# Patient Record
Sex: FEMALE | Race: BLACK OR AFRICAN AMERICAN | NOT HISPANIC OR LATINO | Employment: FULL TIME | URBAN - METROPOLITAN AREA
[De-identification: names, ages, dates, MRNs, and addresses within clinical notes are randomized per-mention and may not be internally consistent; named-entity substitution may affect disease eponyms.]

---

## 2017-11-23 ENCOUNTER — HOSPITAL ENCOUNTER (EMERGENCY)
Facility: HOSPITAL | Age: 27
Discharge: HOME | End: 2017-11-24
Attending: EMERGENCY MEDICINE

## 2017-11-23 ENCOUNTER — APPOINTMENT (EMERGENCY)
Dept: RADIOLOGY | Facility: HOSPITAL | Age: 27
End: 2017-11-23

## 2017-11-23 VITALS
DIASTOLIC BLOOD PRESSURE: 81 MMHG | TEMPERATURE: 98.5 F | OXYGEN SATURATION: 100 % | HEART RATE: 90 BPM | RESPIRATION RATE: 20 BRPM | BODY MASS INDEX: 27.89 KG/M2 | SYSTOLIC BLOOD PRESSURE: 161 MMHG | WEIGHT: 200 LBS

## 2017-11-23 DIAGNOSIS — M25.579 ANKLE PAIN: Primary | ICD-10-CM

## 2017-11-23 PROCEDURE — 73610 X-RAY EXAM OF ANKLE: CPT

## 2017-11-23 PROCEDURE — 96372 THER/PROPH/DIAG INJ SC/IM: CPT

## 2017-11-23 RX ORDER — ACETAMINOPHEN 325 MG/1
975 TABLET ORAL ONCE
Status: COMPLETED | OUTPATIENT
Start: 2017-11-23 | End: 2017-11-23

## 2017-11-23 RX ORDER — NAPROXEN 500 MG/1
500 TABLET ORAL 2 TIMES DAILY WITH MEALS
Qty: 14 TABLET | Refills: 0 | Status: SHIPPED | OUTPATIENT
Start: 2017-11-23 | End: 2018-07-09

## 2017-11-23 RX ORDER — KETOROLAC TROMETHAMINE 30 MG/ML
15 INJECTION, SOLUTION INTRAMUSCULAR; INTRAVENOUS ONCE
Status: COMPLETED | OUTPATIENT
Start: 2017-11-23 | End: 2017-11-23

## 2017-11-23 RX ADMIN — KETOROLAC TROMETHAMINE 15 MG: 30 INJECTION, SOLUTION INTRAMUSCULAR at 23:46

## 2017-11-23 RX ADMIN — ACETAMINOPHEN 975 MG: 325 TABLET, FILM COATED ORAL at 23:46

## 2017-11-24 PROCEDURE — 99284 EMERGENCY DEPT VISIT MOD MDM: CPT

## 2017-11-24 NOTE — ED ATTENDING ATTESTATION
Eva Matta MD, saw and evaluated the patient  I have discussed the patient with the resident/non-physician practitioner and agree with the resident's/non-physician practitioner's findings, Plan of Care, and MDM as documented in the resident's/non-physician practitioner's note, except where noted  All available labs and Radiology studies were reviewed  At this point I agree with the current assessment done in the Emergency Department  I have conducted an independent evaluation of this patient a history and physical is as follows:      Critical Care Time  CritCare Time    27 yo female with right ankle pain after stepping off curb and twisting  Pt unable to bear weight on that ankle  No numbness, tingling, weakness  Hx of ankle sprain as child  No pmh  Vss, afebrile, lungs cta, rrr, right ankle lateral malleolar tenderness, mild swelling  Xray negative for fx, aircast, crutches, pain meds

## 2017-11-24 NOTE — DISCHARGE INSTRUCTIONS
Ankle Sprain, Ambulatory Care   GENERAL INFORMATION:   An ankle sprain happens when 1 or more ligaments in your ankle joint stretch or tear  It is usually caused by a direct injury or sudden twisting of the joint  Common symptoms include the following:   · Trouble moving your ankle or foot    · Pain when you touch or put weight on your ankle    · Bruised, swollen, or odd shaped ankle  Seek immediate care for the following symptoms:   · Severe pain in your ankle    · Cold or numb foot or toes    · A weaker ankle    · Swelling that has increased or returned  Treatment for an ankle sprain  may include a supportive device, such as a brace, cast, or splint  These devices limit movement and protect your joint  You may also need to use crutches to decrease your pain as you move around  Treatment may also include pain medicine, physical therapy, or surgery if the ligament does not heal   Care for an ankle sprain:   · Rest  your joint so that it can heal  Return to normal activities as directed  · Ice  helps decrease swelling and pain  Ice may also help prevent tissue damage  Use an ice pack or put crushed ice in a plastic bag  Cover the ice pack with a towel and place it on your injured ligament for 15 to 20 minutes every hour  Use the ice for as long as directed  · Compression  of an elastic bandage provides support and helps decrease swelling and movement so your joint can heal  Ask if you should wrap an elastic bandage around your injured ligament  Wear as long as directed  · Elevate  your injured ankle raised above the level of your heart as often as you can  This will help decrease or limit swelling  Elevate your ankle by resting it on pillows  Prevent another ankle sprain:   · Return to your usual activities as directed  If you start activity too soon, you may develop a more serious injury  · Take it slow  Slowly increase how often and how long you exercise   Sudden increases may cause you to overstretch or tear your ligament  · Always warm up  and stretch before you exercise or play sports  · Use the proper equipment  Always wear shoes that fit well and are made for the activity that you are doing  You may need to use ankle supports, elbow and knee pads, or braces  Follow up with your healthcare provider as directed:  Write down your questions so you remember to ask them during your visits  CARE AGREEMENT:   You have the right to help plan your care  Learn about your health condition and how it may be treated  Discuss treatment options with your caregivers to decide what care you want to receive  You always have the right to refuse treatment  The above information is an  only  It is not intended as medical advice for individual conditions or treatments  Talk to your doctor, nurse or pharmacist before following any medical regimen to see if it is safe and effective for you  © 2014 [address] is for End User's use only and may not be sold, redistributed or otherwise used for commercial purposes  All illustrations and images included in CareNotes® are the copyrighted property of [employer]  or Peter Coronado

## 2017-11-24 NOTE — ED NOTES
Ace wrap and ankle brace applied to right ankle  Pt  Able to demonstrate adequate use of the crutches         Sandi Thompson RN  11/24/17 0002

## 2017-11-24 NOTE — ED PROVIDER NOTES
History  Chief Complaint   Patient presents with    Ankle Injury     Pt  states that she rolled her ankle off a curb  Denies LOC or head injury  Right ankle is swollen and painful  29 y/o F w/ no PMHx, presents for fall off curb  She states she rolled her right ankle, was not able to ambulate after fall  No Head injury, no LOC  Felt immediate pain in right ankle, no numbness or tingling  No medications prior to arrival              None       History reviewed  No pertinent past medical history  History reviewed  No pertinent surgical history  History reviewed  No pertinent family history  I have reviewed and agree with the history as documented  Social History   Substance Use Topics    Smoking status: Former Smoker    Smokeless tobacco: Never Used    Alcohol use No        Review of Systems   Constitutional: Negative for chills and fever  HENT: Negative for rhinorrhea and sore throat  Respiratory: Negative for cough  Cardiovascular: Negative for chest pain and palpitations  Gastrointestinal: Negative for abdominal pain, nausea and vomiting  Genitourinary: Negative for dysuria, frequency and urgency  Musculoskeletal:        Ankle pain   Neurological: Negative for weakness, light-headedness and headaches  Physical Exam  ED Triage Vitals [11/23/17 2248]   Temperature Pulse Respirations Blood Pressure SpO2   98 5 °F (36 9 °C) 90 20 161/81 100 %      Temp Source Heart Rate Source Patient Position - Orthostatic VS BP Location FiO2 (%)   Oral Monitor Sitting Right arm --      Pain Score       Worst Possible Pain           Orthostatic Vital Signs  Vitals:    11/23/17 2248   BP: 161/81   Pulse: 90   Patient Position - Orthostatic VS: Sitting       Physical Exam   Constitutional: She is oriented to person, place, and time  She appears well-developed and well-nourished  HENT:   Head: Normocephalic and atraumatic  Cardiovascular: Normal rate and regular rhythm    Exam reveals no gallop and no friction rub  No murmur heard  Pulmonary/Chest: Effort normal  She has no wheezes  She has no rales  She exhibits no tenderness  Abdominal: Soft  She exhibits no distension and no mass  There is no rebound and no guarding  Musculoskeletal:   TTP R lateral ankle, minimal swelling  Distally neurovascularly intact   Neurological: She is alert and oriented to person, place, and time  Skin: Skin is warm and dry  Psychiatric: She has a normal mood and affect  Nursing note and vitals reviewed  ED Medications  Medications   ketorolac (TORADOL) 30 mg/mL injection 15 mg (15 mg Intramuscular Given 11/23/17 2346)   acetaminophen (TYLENOL) tablet 975 mg (975 mg Oral Given 11/23/17 2346)       Diagnostic Studies  Results Reviewed     None                 XR ankle 3+ views RIGHT    (Results Pending)         Procedures  Procedures      Phone Consults  ED Phone Contact    ED Course  ED Course                                MDM  Number of Diagnoses or Management Options  Ankle pain:   Diagnosis management comments: 27 y/o female w/ ankle injury, XR neg for FX, will treat symptomatically and provide aircast and Sports Medicine followup    CritCare Time    Disposition  Final diagnoses: Ankle pain     Time reflects when diagnosis was documented in both MDM as applicable and the Disposition within this note     Time User Action Codes Description Comment    11/23/2017 11:46 PM Ramon Chidlers Add [M25 679] Ankle pain       ED Disposition     ED Disposition Condition Comment    Discharge  Elise Cluster discharge to home/self care      Condition at discharge: Stable        Follow-up Information     Follow up With Specialties Details Why Contact Info Additional [address] Emergency Department Emergency Medicine  If symptoms worsen [address]  [phone]  ED, [address] [place] [address] [address] [facility] [address]     [address] Sports Medicine  Schedule an appointment as soon as possible for a visit  [place]  [phone]         Patient's Medications   Discharge Prescriptions    NAPROXEN (NAPROSYN) 500 MG TABLET    Take 1 tablet by mouth 2 (two) times a day with meals       Start Date: 11/23/2017End Date: --       Order Dose: 500 mg       Quantity: 14 tablet    Refills: 0     No discharge procedures on file  ED Provider  Attending physically available and evaluated [facility]  I managed the patient along with the ED Attending      Electronically Signed by         Kip Franco MD  Resident  11/24/17 5001

## 2017-12-01 ENCOUNTER — ALLSCRIPTS OFFICE VISIT (OUTPATIENT)
Dept: OTHER | Facility: OTHER | Age: 27
End: 2017-12-01

## 2018-01-23 VITALS — BODY MASS INDEX: 38.89 KG/M2 | WEIGHT: 277.78 LBS | HEIGHT: 71 IN

## 2018-01-23 NOTE — MISCELLANEOUS
Message  Return to work or school:   Judy Monahan is under my professional care  She was seen in my office on 12/1/2017    She is not able to return to work until 12/5/2017   She is not able to participate in sports or gym class  Justo ROACH        Signatures   Electronically signed by : Justo Tello MD; Dec  1 2017  2:30PM EST                       (Author)

## 2018-07-09 ENCOUNTER — HOSPITAL ENCOUNTER (EMERGENCY)
Facility: HOSPITAL | Age: 28
Discharge: HOME | End: 2018-07-09
Attending: EMERGENCY MEDICINE | Admitting: EMERGENCY MEDICINE

## 2018-07-09 VITALS
DIASTOLIC BLOOD PRESSURE: 91 MMHG | HEART RATE: 71 BPM | HEIGHT: 71 IN | OXYGEN SATURATION: 99 % | WEIGHT: 293 LBS | BODY MASS INDEX: 41.02 KG/M2 | SYSTOLIC BLOOD PRESSURE: 154 MMHG | TEMPERATURE: 98.1 F | RESPIRATION RATE: 20 BRPM

## 2018-07-09 DIAGNOSIS — M62.830 LUMBAR PARASPINAL MUSCLE SPASM: Primary | ICD-10-CM

## 2018-07-09 PROCEDURE — 96372 THER/PROPH/DIAG INJ SC/IM: CPT

## 2018-07-09 PROCEDURE — 99283 EMERGENCY DEPT VISIT LOW MDM: CPT

## 2018-07-09 RX ORDER — LIDOCAINE 50 MG/G
1 PATCH TOPICAL DAILY
Qty: 6 PATCH | Refills: 0 | Status: SHIPPED | OUTPATIENT
Start: 2018-07-09

## 2018-07-09 RX ORDER — LIDOCAINE 50 MG/G
1 PATCH TOPICAL ONCE
Status: DISCONTINUED | OUTPATIENT
Start: 2018-07-09 | End: 2018-07-09 | Stop reason: HOSPADM

## 2018-07-09 RX ORDER — CYCLOBENZAPRINE HCL 10 MG
10 TABLET ORAL
Qty: 5 TABLET | Refills: 0 | Status: SHIPPED | OUTPATIENT
Start: 2018-07-09 | End: 2018-07-14

## 2018-07-09 RX ORDER — CYCLOBENZAPRINE HCL 10 MG
10 TABLET ORAL ONCE
Status: COMPLETED | OUTPATIENT
Start: 2018-07-09 | End: 2018-07-09

## 2018-07-09 RX ORDER — KETOROLAC TROMETHAMINE 30 MG/ML
15 INJECTION, SOLUTION INTRAMUSCULAR; INTRAVENOUS ONCE
Status: COMPLETED | OUTPATIENT
Start: 2018-07-09 | End: 2018-07-09

## 2018-07-09 RX ORDER — IBUPROFEN 600 MG/1
600 TABLET ORAL EVERY 6 HOURS PRN
Qty: 30 TABLET | Refills: 0 | Status: SHIPPED | OUTPATIENT
Start: 2018-07-09 | End: 2018-07-14

## 2018-07-09 RX ORDER — ACETAMINOPHEN 325 MG/1
650 TABLET ORAL ONCE
Status: COMPLETED | OUTPATIENT
Start: 2018-07-09 | End: 2018-07-09

## 2018-07-09 RX ADMIN — CYCLOBENZAPRINE HYDROCHLORIDE 10 MG: 10 TABLET, FILM COATED ORAL at 19:41

## 2018-07-09 RX ADMIN — LIDOCAINE 1 PATCH: 50 PATCH CUTANEOUS at 19:40

## 2018-07-09 RX ADMIN — KETOROLAC TROMETHAMINE 15 MG: 30 INJECTION, SOLUTION INTRAMUSCULAR at 19:40

## 2018-07-09 RX ADMIN — ACETAMINOPHEN 650 MG: 325 TABLET ORAL at 19:41

## 2018-07-09 NOTE — ED ATTENDING ATTESTATION
Sammy Gomez MD, saw and evaluated the patient  I have discussed the patient with the resident/non-physician practitioner and agree with the resident's/non-physician practitioner's findings, Plan of Care, and MDM as documented in the resident's/non-physician practitioner's note, except where noted  All available labs and Radiology studies were reviewed  At this point I agree with the current assessment done in the Emergency Department  I have conducted an independent evaluation of this patient a history and physical is as follows:    Tweaked lumbar spin whenen stood up after lifting her daughter  and started with pain in back pain radiates to  Legs bialterally pins and needles L greater than r no weakness no numbness no bowel bladder symptoms  The patient did not have direct trauma to her lower legs    PE:  Alert heart regular lungs clear abdomen soft nondistended nontender tender posterior paravertebral muscles bilaterally motor 5/5 sensation within normal limits DTRs 2+ will treat pain refer for BT and further workup  Critical Care Time  CritCare Time    Procedures

## 2018-07-10 NOTE — ED PROVIDER NOTES
History  Chief Complaint   Patient presents with    Back Pain     pt was at work today and when she bent down and stood back up she had severe pain in her back  pt reports tingling in her hands and tingling and numbness in her feet  48-year-old female with no significant past medical history presents for back pain  She was at work earlier in the day bent over and twisted at the same time had a mild spasm in her paraspinal lumbar region  She then went home and when getting out of her car she had a worsening of the spasm on the bilateral paraspinal musculature  She reports the pain made it difficult to walk  She denies any weakness to the legs  She initially had pins and needle sensation in all 5 toes bilaterally  She denies any saddle anesthesia, urinary retention or incontinence, no numbness in the legs, no history of recent trauma or falls, no history of IV drug abuse and no fevers  No history of back pain or previous surgeries  She did not take any medications prior to arrival             None       History reviewed  No pertinent past medical history  History reviewed  No pertinent surgical history  History reviewed  No pertinent family history  I have reviewed and agree with the history as documented  Social History   Substance Use Topics    Smoking status: Former Smoker    Smokeless tobacco: Never Used    Alcohol use No        Review of Systems   Constitutional: Negative for chills, fatigue and fever  HENT: Negative for congestion  Eyes: Negative for visual disturbance  Respiratory: Negative for chest tightness and shortness of breath  Cardiovascular: Negative for chest pain and palpitations  Gastrointestinal: Negative for abdominal pain, nausea and vomiting  Genitourinary: Negative for decreased urine volume, difficulty urinating, flank pain, pelvic pain, urgency, vaginal bleeding and vaginal discharge  Musculoskeletal: Positive for back pain and gait problem   Negative for arthralgias, myalgias, neck pain and neck stiffness  Skin: Negative for rash  Neurological: Negative for dizziness, syncope, weakness, light-headedness, numbness and headaches  Physical Exam  ED Triage Vitals [07/09/18 1649]   Temperature Pulse Respirations Blood Pressure SpO2   98 1 °F (36 7 °C) 80 20 (!) 190/89 98 %      Temp Source Heart Rate Source Patient Position - Orthostatic VS BP Location FiO2 (%)   Oral Monitor Sitting Left arm --      Pain Score       Worst Possible Pain           Orthostatic Vital Signs  Vitals:    07/09/18 1842 07/09/18 1845 07/09/18 1943 07/09/18 2138   BP: 149/79 145/79 158/88 154/91   Pulse: 70 73 68 71   Patient Position - Orthostatic VS: Sitting Sitting Sitting Sitting       Physical Exam   Constitutional: She is oriented to person, place, and time  Vital signs are normal  She appears well-developed and well-nourished  She does not appear ill  No distress  HENT:   Head: Normocephalic and atraumatic  Head is without abrasion and without contusion  Right Ear: Tympanic membrane normal    Left Ear: Tympanic membrane normal    Nose: Nose normal    Mouth/Throat: Uvula is midline, oropharynx is clear and moist and mucous membranes are normal    Eyes: Conjunctivae and EOM are normal  Pupils are equal, round, and reactive to light  Neck: Trachea normal, normal range of motion, full passive range of motion without pain and phonation normal  Neck supple  No JVD present  No spinous process tenderness and no muscular tenderness present  Carotid bruit is not present  Normal range of motion present  No thyromegaly present  Cardiovascular: Normal rate, regular rhythm and intact distal pulses  Exam reveals no friction rub  No murmur heard  Pulmonary/Chest: Effort normal and breath sounds normal  No accessory muscle usage or stridor  No tachypnea  No respiratory distress  She has no decreased breath sounds  She has no wheezes  She has no rhonchi  She has no rales   She exhibits no tenderness, no crepitus, no edema and no retraction  Abdominal: Soft  Normal appearance and bowel sounds are normal  She exhibits no distension  There is no tenderness  There is no rigidity, no rebound, no guarding and no CVA tenderness  Musculoskeletal:        Cervical back: Normal         Thoracic back: Normal         Lumbar back: She exhibits decreased range of motion, pain and spasm  She exhibits no tenderness and no bony tenderness  Palpable spasm R>L lumbar paraspinal     Lymphadenopathy:     She has no cervical adenopathy  Neurological: She is alert and oriented to person, place, and time  She has normal strength  She is not disoriented  She displays no atrophy  No cranial nerve deficit or sensory deficit  She exhibits normal muscle tone  Gait normal  GCS eye subscore is 4  GCS verbal subscore is 5  GCS motor subscore is 6  Able to ambulate without difficulty, 5 out of 5 lower extremity strength at the hip, knee, plantar and dorsiflexion  Normal sensation that medial, lateral malleolus as well as first webspace  2+ patellar reflexes bilaterally  Negative straight leg raise bilaterally  Normal sensation to perirectal area  Skin: Skin is warm, dry and intact  No rash noted  She is not diaphoretic  Psychiatric: She has a normal mood and affect  Nursing note and vitals reviewed        ED Medications  Medications   ketorolac (TORADOL) injection 15 mg (15 mg Intramuscular Given 7/9/18 1940)   acetaminophen (TYLENOL) tablet 650 mg (650 mg Oral Given 7/9/18 1941)   cyclobenzaprine (FLEXERIL) tablet 10 mg (10 mg Oral Given 7/9/18 1941)       Diagnostic Studies  Results Reviewed     None                 No orders to display         Procedures  Procedures      Phone Consults  ED Phone Contact    ED Course  ED Course as of Jul 10 0126   Mon Jul 09, 2018   2023 Pt ambulating wo difficulty       discussed return precautions including numbness in the groin, difficulty walking, numbness in the lower extremities  Also discussed importance follow-up with primary care doctor, symptomatic treatment  Patient verbalized understanding and agrees with plan of care  [facility]  CritCare Time    Disposition  Final diagnoses:   Lumbar paraspinal muscle spasm     Time reflects when diagnosis was documented in both MDM as applicable and the Disposition within this note     Time User Action Codes Description Comment    7/9/2018  9:31 PM Kenya Liu Add [E18 [id number]] Lumbar paraspinal muscle spasm       ED Disposition     ED Disposition Condition Comment    Discharge  Nain Horner discharge to home/self care  Condition at discharge: Good        Follow-up Information     Follow up With Specialties Details Why [address] [facility] Emergency Department Emergency Medicine Go to If symptoms worsen [address] [place] [address] ED, [address], [address]          Discharge Medication List as of 7/9/2018  9:33 PM      START taking these medications    Details   cyclobenzaprine (FLEXERIL) 10 mg tablet Take 1 tablet (10 mg total) by mouth daily at bedtime for 5 days, Starting Mon 7/9/2018, Until Sat 7/14/2018, Print      ibuprofen (MOTRIN) 600 mg tablet Take 1 tablet (600 mg total) by mouth every 6 (six) hours as needed for mild pain for up to 5 days, Starting Mon 7/9/2018, Until Sat 7/14/2018, Print      lidocaine (LIDODERM) 5 % Place 1 patch on the skin daily Remove & Discard patch within 12 hours or as directed by MD, Starting Mon 7/9/2018, Print           No discharge procedures on file  ED Provider  Attending physically available and evaluated Nain Horner I managed the patient along with the ED Attending      Electronically Signed by         Ashleigh Sandoval DO  07/10/18 0128

## 2018-07-10 NOTE — DISCHARGE INSTRUCTIONS
Return if you develop worsening back pain, numbness in the groin, inability to urinate, difficulty walking, loss of bowel control, numbness in the legs or any other concerning symptoms  Follow up with your primary doctor in the next 2-3 days for a re check  Muscle Spasm   WHAT YOU NEED TO KNOW:   A muscle spasm is a sudden contraction of any muscle or group of muscles  A muscle cramp is a painful muscle spasm  Muscle cramps commonly occur after intense exercise or during pregnancy  They may also be caused by certain medications, dehydration, low calcium or magnesium levels, or another medical condition  DISCHARGE INSTRUCTIONS:   Medicines: You may need the following:  · NSAIDs  help decrease swelling and pain or fever  This medicine is available with or without a doctor's order  NSAIDs can cause stomach bleeding or kidney problems in certain people  If you take blood thinner medicine, always ask your healthcare provider if NSAIDs are safe for you  Always read the medicine label and follow directions  · Take your medicine as directed  Contact your healthcare provider if you think your medicine is not helping or if you have side effects  Tell him of her if you are allergic to any medicine  Keep a list of the medicines, vitamins, and herbs you take  Include the amounts, and when and why you take them  Bring the list or the pill bottles to follow-up visits  Carry your medicine list with you in case of an emergency  Follow up with your healthcare provider as directed: You may need other tests or treatment  You may also be referred to a physical therapist or other specialist  Write down your questions so you remember to ask them during your visits  Self-care:   · Stretch  your muscle to help relieve the cramp  It may be helpful to keep your muscle in the stretched position until the cramp is gone  · Apply heat  to help decrease pain and muscle spasms   Apply heat on the area for 20 to 30 minutes every 2 hours for as many days as directed  · Apply ice  to help decrease swelling and pain  Ice may also help prevent tissue damage  Use an ice pack, or put crushed ice in a plastic bag  Cover it with a towel and place it on your muscle for 15 to 20 minutes every hour or as directed  · Drink more liquids  to help prevent muscle cramps caused by dehydration  Sports drinks may help replace electrolytes you lose through sweat during exercise  Ask your healthcare provider how much liquid to drink each day and which liquids are best for you  · Eat healthy foods , such as fruits, vegetables, whole grains, low-fat dairy products, and lean proteins (meat, beans, and fish)  If you are pregnant, ask your healthcare provider about foods that are high in magnesium and sodium  They may help to relieve cramps during pregnancy  · Massage your muscle  to help relieve the cramp  · Take frequent deep breaths  until the cramp feels better  Lie down while you take the deep breaths so you do not get dizzy or lightheaded  Contact your healthcare provider if:   · You have signs of dehydration, such as a headache, dark yellow urine, dry eyes or mouth, or a fast heartbeat  · You have questions or concerns about your condition or care  Return to the emergency department if:   · You have warmth, swelling, or redness in the cramping muscle  · You have frequent or unrelieved muscle cramps in several different muscles  · You have muscle cramps with numbness, tingling, and burning in your hands and feet  © 2017 [address]  Information is for End User's use only and may not be sold, redistributed or otherwise used for commercial purposes  All illustrations and images included in CareNotes® are the copyrighted property of A D A M [employer] Inc  or Peter Coronado  The above information is an  only  It is not intended as medical advice for individual conditions or treatments   Talk to your doctor, nurse or pharmacist before following any medical regimen to see if it is safe and effective for you

## 2018-12-03 ENCOUNTER — HOSPITAL ENCOUNTER (EMERGENCY)
Facility: HOSPITAL | Age: 28
Discharge: HOME | End: 2018-12-03
Attending: EMERGENCY MEDICINE

## 2018-12-03 VITALS
TEMPERATURE: 98.4 F | DIASTOLIC BLOOD PRESSURE: 88 MMHG | SYSTOLIC BLOOD PRESSURE: 149 MMHG | HEART RATE: 86 BPM | OXYGEN SATURATION: 98 % | RESPIRATION RATE: 18 BRPM
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
## 2018-12-03 PROCEDURE — 99282 EMERGENCY DEPT VISIT SF MDM: CPT

## 2018-12-03 RX ORDER — DIPHENHYDRAMINE HCL 25 MG
25 CAPSULE ORAL EVERY 6 HOURS PRN
Qty: 10 CAPSULE | Refills: 0 | Status: SHIPPED | OUTPATIENT
Start: 2018-12-03 | End: 2018-12-06

## 2018-12-03 NOTE — DISCHARGE INSTRUCTIONS
Insect Bite or Sting   WHAT YOU NEED TO KNOW:   Most insect bites and stings are not dangerous and go away without treatment  Your symptoms may be mild, or you may develop anaphylaxis  Anaphylaxis is a sudden, life-threatening reaction that needs immediate treatment  Common examples of insects that bite or sting are bees, ticks, mosquitoes, spiders, and ants  Insect bites or stings can lead to diseases such as malaria, West Nile virus, Lyme disease, or [place] Spotted Fever  DISCHARGE INSTRUCTIONS:   Call 911 for signs or symptoms of anaphylaxis,  such as trouble breathing, swelling in your mouth or throat, or wheezing  You may also have itching, a rash, hives, or feel like you are going to faint  Return to the emergency department if:   · You are stung on your tongue or in your throat  · A white area forms around the bite  · You are sweating badly or have body pain  · You think you were bitten or stung by a poisonous insect  Contact your healthcare provider if:   · You have a fever  · The area becomes red, warm, tender, and swollen beyond the area of the bite or sting  · You have questions or concerns about your condition or care  Medicines:   · Antihistamines  decrease itching and rash  · Epinephrine  is used to treat severe allergic reactions such as anaphylaxis  · Take your medicine as directed  Contact your healthcare provider if you think your medicine is not helping or if you have side effects  Tell him of her if you are allergic to any medicine  Keep a list of the medicines, vitamins, and herbs you take  Include the amounts, and when and why you take them  Bring the list or the pill bottles to follow-up visits  Carry your medicine list with you in case of an emergency  Steps to take for signs or symptoms of anaphylaxis:   · Immediately  give 1 shot of epinephrine only into the outer thigh muscle  · Leave the shot in place  as directed   Your healthcare provider may recommend you leave it in place for up to 10 seconds before you remove it  This helps make sure all of the epinephrine is delivered  · Call 911 and go to the emergency department,  even if the shot improved symptoms  Do not drive yourself  Bring the used epinephrine shot with you  Safety precautions to take if you are at risk for anaphylaxis:   · Keep 2 shots of epinephrine with you at all times  You may need a second shot, because epinephrine only works for about 20 minutes and symptoms may return  Your healthcare provider can show you and family members how to give the shot  Check the expiration date every month and replace it before it expires  · Create an action plan  Your healthcare provider can help you create a written plan that explains the allergy and an emergency plan to treat a reaction  The plan explains when to give a second epinephrine shot if symptoms return or do not improve after the first  Give copies of the action plan and emergency instructions to family members, work and school staff, and  providers  Show them how to give a shot of epinephrine  · Carry medical alert identification  Wear medical alert jewelry or carry a card that says you have an insect allergy  Ask your healthcare provider where to get these items  If an insect bites or stings you:   · Remove the stinger  Scrape the stinger out with your fingernail, edge of a credit card, or a knife blade  Do not squeeze the wound  Gently wash the area with soap and water  · Remove the tick  Ticks must be removed as soon as possible so you do not get diseases passed through tick bites  Ask your healthcare provider for more information on tick bites and how to remove ticks  Care for a bite or sting wound:   · Elevate the affected area  Prop the wound above the level of your heart, if possible  Elevate the area for 10 to 20 minutes each hour or as directed by your healthcare provider  · Use compresses    Soak a clean washcloth in cold water, wring it out, and put it on the bite or sting  Use the compress for 10 to 20 minutes each hour or as directed by your healthcare provider  After 24 to 48 hours, change to warm compresses  · Apply a paste  Add water to baking soda to make a thick paste  Put the paste on the area for 5 minutes  Rinse gently to remove the paste  Prevent another insect bite or sting:   · Do not wear bright-colored or flower-print clothing when you plan to spend time outdoors  Do not use hairspray, perfumes, or aftershave  · Do not leave food out  · Empty any standing water and wash container with soap and water every 2 days  · Put screens on all open windows and doors  · Put insect repellent that contains DEET on skin that is showing when you go outside  Put insect repellent at the top of your boots, bottom of pant legs, and sleeve cuffs  Wear long sleeves, pants, and shoes  · Use citronella candles outdoors to help keep mosquitoes away  Put a tick and flea collar on pets  Follow up with your healthcare provider as directed:  Write down your questions so you remember to ask them during your visits  © 2017 [facility] Information is for End User's use only and may not be sold, redistributed or otherwise used for commercial purposes  All illustrations and images included in CareNotes® are the copyrighted property of [employer]  or Peter Coronado  The above information is an  only  It is not intended as medical advice for individual conditions or treatments  Talk to your doctor, nurse or pharmacist before following any medical regimen to see if it is safe and effective for you

## 2018-12-03 NOTE — ED PROVIDER NOTES
History  Chief Complaint   Patient presents with    Wound Check     patient reports she was bitten by "something" in an uber on Friday on the back of her thigh, reports swelling and redness but no drainage, no fevers     Patient is a 33year old female who presents with a few small bug bits on the posterior aspect of the right thigh that she noticed on Saturday after riding in an [employer] in a short dress with thigh touching seat on Friday  Reports that the bites are very itchy  Only localized to that one area  Patient has been applying hydrocortisone to the area in order to help with the itching and reports that it is helping  Since she works in [employer], her boss asked that she go to the doctor to make sure that these are not bed bug bites prior to returning to work  Prior to Admission Medications   Prescriptions Last Dose Informant Patient Reported? Taking? cyclobenzaprine (FLEXERIL) 10 mg tablet   No No   Sig: Take 1 tablet (10 mg total) by mouth daily at bedtime for 5 days   ibuprofen (MOTRIN) 600 mg tablet   No No   Sig: Take 1 tablet (600 mg total) by mouth every 6 (six) hours as needed for mild pain for up to 5 days   lidocaine (LIDODERM) 5 %   No No   Sig: Place 1 patch on the skin daily Remove & Discard patch within 12 hours or as directed by MD      Facility-Administered Medications: None       History reviewed  No pertinent past medical history  Past Surgical History:   Procedure Laterality Date    DILATION AND CURETTAGE OF UTERUS         No family history on file  I have reviewed and agree with the history as documented  Social History   Substance Use Topics    Smoking status: Former Smoker    Smokeless tobacco: Never Used    Alcohol use No        Review of Systems   Constitutional: Negative for chills and fever  HENT: Negative for congestion, facial swelling, rhinorrhea, trouble swallowing and voice change  Eyes: Negative for pain, redness and itching  Respiratory: Negative for shortness of breath and wheezing  Cardiovascular: Negative for chest pain and palpitations  Gastrointestinal: Negative for nausea and vomiting  Skin: Positive for rash  Negative for pallor  Physical Exam  ED Triage Vitals [12/03/18 1524]   Temperature Pulse Respirations Blood Pressure SpO2   98 4 °F (36 9 °C) 86 18 149/88 98 %      Temp Source Heart Rate Source Patient Position - Orthostatic VS BP Location FiO2 (%)   Tympanic Monitor Sitting Right arm --      Pain Score       --           Orthostatic Vital Signs  Vitals:    12/03/18 1524   BP: 149/88   Pulse: 86   Patient Position - Orthostatic VS: Sitting       Physical Exam   Constitutional: She is oriented to person, place, and time  She appears well-developed and well-nourished  No distress  HENT:   Head: Normocephalic and atraumatic  Right Ear: External ear normal    Left Ear: External ear normal    Nose: Nose normal    Mouth/Throat: Oropharynx is clear and moist    Eyes: Pupils are equal, round, and reactive to light  Conjunctivae and EOM are normal    Neck: Normal range of motion  Neck supple  Cardiovascular: Normal rate, regular rhythm, normal heart sounds and intact distal pulses  Exam reveals no gallop and no friction rub  No murmur heard  Pulmonary/Chest: Effort normal and breath sounds normal  No respiratory distress  She has no wheezes  She has no rales  She exhibits no tenderness  Abdominal: Soft  Bowel sounds are normal  She exhibits no distension  There is no tenderness  Neurological: She is alert and oriented to person, place, and time  Skin: Skin is warm and dry  She is not diaphoretic  Psychiatric: She has a normal mood and affect  Her behavior is normal    Nursing note and vitals reviewed        ED Medications  Medications - No data to display    Diagnostic Studies  Results Reviewed     None                 No orders to display         Procedures  Procedures      Phone Consults  ED Phone Contact    ED Course                               MDM  Number of Diagnoses or Management Options  Bug bite, initial encounter:   Diagnosis management comments: Assessment and plan:  Insect bites which patient is already appropriately treating with hydrocortisone cream   Suggested taking Benadryl before nighttime to help with itching in addition to the hydrocortisone  Reassured patient that these are not bed bug bites  Patient will follow up with her primary care provider and is cleared to return to work  CritCare Time    Disposition  Final diagnoses:   Bug bite, initial encounter     Time reflects when diagnosis was documented in both MDM as applicable and the Disposition within this note     Time User Action Codes Description Comment    12/3/2018  4:00 PM Cornelio Sue Add [N25  XXXA] Bug bite, initial encounter       ED Disposition     ED Disposition Condition Comment    Discharge  Guadalupe Mancilla discharge to home/self care  Condition at discharge: Good        Follow-up Information     Follow up With Specialties Details Why [facility] Emergency Department Emergency Medicine Go to for re-evaluation, As needed, If symptoms worsen [facility], [address], [place], [place], [address]          Patient's Medications   Discharge Prescriptions    DIPHENHYDRAMINE (BENADRYL) 25 MG CAPSULE    Take 1 capsule (25 mg total) by mouth every 6 (six) hours as needed for itching for up to 3 days       Start Date: 12/3/2018 End Date: 12/6/2018       Order Dose: 25 mg       Quantity: 10 capsule    Refills: 0     No discharge procedures on file  ED Provider  Attending physically available and evaluated Guadalupe Mancilla  I managed the patient along with the ED Attending      Electronically Signed by         Belkis Escobar DO  12/03/18 5015

## 2018-12-03 NOTE — ED ATTENDING ATTESTATION
Laya Paniagua DO, saw and evaluated the patient  I have discussed the patient with the resident/non-physician practitioner and agree with the resident's/non-physician practitioner's findings, Plan of Care, and MDM as documented in the resident's/non-physician practitioner's note, except where noted  All available labs and Radiology studies were reviewed  At this point I agree with the current assessment done in the Emergency Department  I have conducted an independent evaluation of this patient a history and physical is as follows:    Patient complains of 1 week h/o itching, burning bumps on her right posterior thigh  Symptoms started after she took an override while wearing a short dress in which that part of her thigh was exposed  No rash or a similar lesions anywhere else on her body  She has no known exposure in her home to bed bugs or fleas  No h/o similar symptoms except mosquito bites which she initially thought these were  She has been applying hydrocortisone cream with some relief  She works in [employer] and was told by her supervisor to be evaluated for contagious or infectious etiologies prior to returning to work  No recent travel or sick contacts  ROS: No associated fever, LH/dizziness, CP, SOB, n/v/d  Denies other complaint  PE: NAD, appears comfortable, alert; PERRL, EOMI; MMM, no posterior oropharyngeal exudate, edema or erythema; HRR, no murmur; lungs CTA w/o w/r/r, POx 98% on RA (nl); (-) LE edema, FROM extremities x4; skin p/w/d  DDx:  Rash - insect bites, doubt infectious etiology or parasitic infestation  A/P: Will treat symptoms, recommend continued supportive treatment at home            Critical Care Time  CritCare Time    Procedures

## 2021-09-21 ENCOUNTER — IMMUNIZATIONS (OUTPATIENT)
Dept: FAMILY MEDICINE CLINIC | Facility: HOSPITAL | Age: 31
End: 2021-09-21

## 2021-09-21 DIAGNOSIS — Z23 ENCOUNTER FOR IMMUNIZATION: Primary | ICD-10-CM

## 2021-09-21 PROCEDURE — 0001A SARS-COV-2 / COVID-19 MRNA VACCINE (PFIZER-BIONTECH) 30 MCG: CPT

## 2021-09-21 PROCEDURE — 91300 SARS-COV-2 / COVID-19 MRNA VACCINE (PFIZER-BIONTECH) 30 MCG: CPT

## 2021-10-12 ENCOUNTER — IMMUNIZATIONS (OUTPATIENT)
Dept: FAMILY MEDICINE CLINIC | Facility: HOSPITAL | Age: 31
End: 2021-10-12

## 2021-10-12 DIAGNOSIS — Z23 ENCOUNTER FOR IMMUNIZATION: Primary | ICD-10-CM

## 2021-10-12 PROCEDURE — 0002A SARS-COV-2 / COVID-19 MRNA VACCINE (PFIZER-BIONTECH) 30 MCG: CPT

## 2021-10-12 PROCEDURE — 91300 SARS-COV-2 / COVID-19 MRNA VACCINE (PFIZER-BIONTECH) 30 MCG: CPT

## 2021-11-19 ENCOUNTER — APPOINTMENT (OUTPATIENT)
Dept: URGENT CARE | Facility: CLINIC | Age: 31
End: 2021-11-19

## 2021-11-19 DIAGNOSIS — Z00.00 PHYSICAL EXAM: ICD-10-CM

## 2021-11-19 PROCEDURE — 86480 TB TEST CELL IMMUN MEASURE: CPT

## 2021-11-19 PROCEDURE — 86762 RUBELLA ANTIBODY: CPT

## 2021-11-19 PROCEDURE — 86787 VARICELLA-ZOSTER ANTIBODY: CPT

## 2021-11-19 PROCEDURE — 86735 MUMPS ANTIBODY: CPT

## 2021-11-19 PROCEDURE — 86765 RUBEOLA ANTIBODY: CPT

## 2021-11-20 LAB — RUBV IGG SERPL IA-ACNC: 67.6 IU/ML

## 2021-11-22 LAB
MEV IGG SER QL: NORMAL
VZV IGG SER IA-ACNC: NORMAL

## 2021-11-23 LAB
GAMMA INTERFERON BACKGROUND BLD IA-ACNC: 0.11 IU/ML
M TB IFN-G BLD-IMP: NEGATIVE
M TB IFN-G CD4+ BCKGRND COR BLD-ACNC: -0.04 IU/ML
M TB IFN-G CD4+ BCKGRND COR BLD-ACNC: -0.05 IU/ML
MITOGEN IGNF BCKGRD COR BLD-ACNC: >10 IU/ML
MUV IGG SER QL: NORMAL

## 2023-01-11 ENCOUNTER — HOSPITAL ENCOUNTER (EMERGENCY)
Facility: HOSPITAL | Age: 33
Discharge: HOME | End: 2023-01-11
Attending: EMERGENCY MEDICINE

## 2023-01-11 ENCOUNTER — APPOINTMENT (EMERGENCY)
Dept: RADIOLOGY | Facility: HOSPITAL | Age: 33
End: 2023-01-11

## 2023-01-11 VITALS
RESPIRATION RATE: 18 BRPM | DIASTOLIC BLOOD PRESSURE: 111 MMHG | HEART RATE: 82 BPM | TEMPERATURE: 98.6 F | OXYGEN SATURATION: 100 % | SYSTOLIC BLOOD PRESSURE: 166 MMHG

## 2023-01-11 DIAGNOSIS — M25.572 ACUTE LEFT ANKLE PAIN: Primary | ICD-10-CM

## 2023-01-11 RX ORDER — NAPROXEN 500 MG/1
500 TABLET ORAL 2 TIMES DAILY WITH MEALS
Qty: 14 TABLET | Refills: 0 | Status: SHIPPED | OUTPATIENT
Start: 2023-01-11 | End: 2023-01-26 | Stop reason: ALTCHOICE

## 2023-01-11 RX ORDER — KETOROLAC TROMETHAMINE 30 MG/ML
15 INJECTION, SOLUTION INTRAMUSCULAR; INTRAVENOUS ONCE
Status: COMPLETED | OUTPATIENT
Start: 2023-01-11 | End: 2023-01-11

## 2023-01-11 RX ORDER — ACETAMINOPHEN 325 MG/1
650 TABLET ORAL ONCE
Status: COMPLETED | OUTPATIENT
Start: 2023-01-11 | End: 2023-01-11

## 2023-01-11 RX ADMIN — ACETAMINOPHEN 650 MG: 325 TABLET ORAL at 15:38

## 2023-01-11 RX ADMIN — KETOROLAC TROMETHAMINE 15 MG: 30 INJECTION, SOLUTION INTRAMUSCULAR; INTRAVENOUS at 17:14

## 2023-01-11 NOTE — ED PROVIDER NOTES
History  Chief Complaint   Patient presents with   • Ankle Pain     Pt reports stepping off curb into car and her ankle caved in  Left ankle visibly swollen and distorted  Pt reports 10/10 Pain  Patient is a 50-year-old female with no significant past medical history, currently presenting for evaluation of left-sided ankle pain  She states just prior to arrival she was walking and stepped off of a curb, suffering a inversion type injury to her left ankle  There is a constant, sharp pain in her left ankle that radiates up to her mid anterior shin, and is worsened by motion  She has also noticed some swelling in the lateral aspect of her ankle  She denies any changes in sensation  She denies any knee pain  She denies any other trauma during the event  She has not attempted to ambulate or put weight on the ankle since her injury  Prior to Admission Medications   Prescriptions Last Dose Informant Patient Reported? Taking? cyclobenzaprine (FLEXERIL) 10 mg tablet   No No   Sig: Take 1 tablet (10 mg total) by mouth daily at bedtime for 5 days   diphenhydrAMINE (BENADRYL) 25 mg capsule   No No   Sig: Take 1 capsule (25 mg total) by mouth every 6 (six) hours as needed for itching for up to 3 days   ibuprofen (MOTRIN) 600 mg tablet   No No   Sig: Take 1 tablet (600 mg total) by mouth every 6 (six) hours as needed for mild pain for up to 5 days   lidocaine (LIDODERM) 5 %   No No   Sig: Place 1 patch on the skin daily Remove & Discard patch within 12 hours or as directed by MD      Facility-Administered Medications: None       History reviewed  No pertinent past medical history  Past Surgical History:   Procedure Laterality Date   • DILATION AND CURETTAGE OF UTERUS         History reviewed  No pertinent family history  I have reviewed and agree with the history as documented      E-Cigarette/Vaping     E-Cigarette/Vaping Substances     Social History     Tobacco Use   • Smoking status: Former   • The patient is a 79y Female complaining of pain, lower leg. Smokeless tobacco: Never   Substance Use Topics   • Alcohol use: No   • Drug use: No        Review of Systems   Constitutional: Negative for chills and fever  Respiratory: Negative for shortness of breath  Cardiovascular: Negative for chest pain  Gastrointestinal: Negative for abdominal pain  Musculoskeletal: Positive for arthralgias (left ankle) and gait problem  Negative for back pain and neck pain  Skin: Negative for rash  Neurological: Negative for light-headedness and headaches  All other systems reviewed and are negative  Physical Exam  ED Triage Vitals   Temperature Pulse Respirations Blood Pressure SpO2   01/11/23 1532 01/11/23 1532 01/11/23 1532 01/11/23 1532 01/11/23 1532   98 6 °F (37 °C) 82 18 (!) 166/111 99 %      Temp Source Heart Rate Source Patient Position - Orthostatic VS BP Location FiO2 (%)   01/11/23 1532 01/11/23 1532 01/11/23 1532 01/11/23 1532 --   Oral Monitor Sitting Right arm       Pain Score       01/11/23 1538       10 - Worst Possible Pain             Orthostatic Vital Signs  Vitals:    01/11/23 1532   BP: (!) 166/111   Pulse: 82   Patient Position - Orthostatic VS: Sitting       Physical Exam  Vitals and nursing note reviewed  Constitutional:       General: She is not in acute distress  Appearance: Normal appearance  She is obese  She is not ill-appearing or toxic-appearing  HENT:      Head: Normocephalic and atraumatic  Right Ear: External ear normal       Left Ear: External ear normal       Nose: Nose normal    Eyes:      General: No scleral icterus  Right eye: No discharge  Left eye: No discharge  Extraocular Movements: Extraocular movements intact  Conjunctiva/sclera: Conjunctivae normal    Cardiovascular:      Rate and Rhythm: Normal rate and regular rhythm  Heart sounds: Normal heart sounds  No murmur heard  No friction rub  No gallop  Pulmonary:      Effort: Pulmonary effort is normal  No respiratory distress  Breath sounds: Normal breath sounds  Abdominal:      General: Abdomen is flat  There is no distension  Palpations: Abdomen is soft  There is no mass  Tenderness: There is no abdominal tenderness  Genitourinary:     Comments: Deferred  Musculoskeletal:      Cervical back: Normal range of motion  Comments: Left: Proximal tibia nontender, medial malleolus nontender, lateral malleolus tender to palpation with overlying swelling, base of 5th nontender, rest of foot and ankle without marked tenderness, ROM limited due to pain with 4/5 strength, skin on plantar section of midfoot without ecchymosis, capillary refill <2 seconds and distal sensation to light touch intact per routine, DP pulses 2+/4, compartments surrounding are soft  The left knee is normal    Skin:     General: Skin is warm and dry  Neurological:      General: No focal deficit present  Mental Status: She is alert  Psychiatric:         Mood and Affect: Mood normal          ED Medications  Medications   acetaminophen (TYLENOL) tablet 650 mg (650 mg Oral Given 1/11/23 1538)   ketorolac (TORADOL) injection 15 mg (15 mg Intramuscular Given 1/11/23 1714)       Diagnostic Studies  Results Reviewed     None                 XR ankle 3+ views LEFT   ED Interpretation by Magda Guzmán DO (01/11 1924)   Abnormal   Questionable cortical disruption of the tibial cortex which may represent a small avulsion fracture seen best on the lateral view      Final Result by Cheri Morrison MD (01/12 6679)      Soft tissue swelling over the lateral malleolus without acute osseous injury  Workstation performed: [id number]               Procedures  Procedures      ED Course                                       Medical Decision Making  Patient is a 30-year-old female presenting for evaluation of left-sided ankle pain    Based on history and evaluation, differential diagnosis includes MSK pain, minor ligamentous injury, fracture  Plan: X-ray left ankle, pain control    On reassessment, patient's pain controlled  Continues to be well-appearing  Imaging with a questionable area of minor osseous disruption that is less likely to represent a small avulsion fracture, although cannot be completely excluded  Most likely ankle sprain/ nonemergent ligamentous injury  There is noted swelling near the distribution of the lateral malleolus noted on imaging  Patient placed into a air splint, and given crutches  Patient was taught to crutch walk by me  Podiatry consult placed  Naproxen sent to patient's pharmacy  Patient stable for discharge home with podiatry follow-up  Patient seems to understand this plan and is agreeable  All questions answered  Patient discharged home with return precautions  Acute left ankle pain: acute illness or injury  Amount and/or Complexity of Data Reviewed  Radiology: ordered and independent interpretation performed  Risk  OTC drugs  Prescription drug management  Disposition  Final diagnoses:   Acute left ankle pain     Time reflects when diagnosis was documented in both MDM as applicable and the Disposition within this note     Time User Action Codes Description Comment    1/11/2023  7:21 PM Nicolas Melton Add [[id number]] Acute left ankle pain       ED Disposition     ED Disposition   Discharge    Condition   Stable    Date/Time   Wed Jan 11, 2023  7:21 PM    Comment   Veena Diaz discharge to home/self care                 Follow-up Information    None         Discharge Medication List as of 1/11/2023  7:26 PM      START taking these medications    Details   naproxen (Naprosyn) 500 mg tablet Take 1 tablet (500 mg total) by mouth 2 (two) times a day with meals for 7 days, Starting Wed 1/11/2023, Until Wed 1/18/2023, Normal         CONTINUE these medications which have NOT CHANGED    Details   cyclobenzaprine (FLEXERIL) 10 mg tablet Take 1 tablet (10 mg total) by mouth daily at bedtime for 5 days, Starting Mon 7/9/2018, Until Sat 7/14/2018, Print      diphenhydrAMINE (BENADRYL) 25 mg capsule Take 1 capsule (25 mg total) by mouth every 6 (six) hours as needed for itching for up to 3 days, Starting Mon 12/3/2018, Until Thu 12/6/2018, Normal      ibuprofen (MOTRIN) 600 mg tablet Take 1 tablet (600 mg total) by mouth every 6 (six) hours as needed for mild pain for up to 5 days, Starting Mon 7/9/2018, Until Sat 7/14/2018, Print      lidocaine (LIDODERM) 5 % Place 1 patch on the skin daily Remove & Discard patch within 12 hours or as directed by MD, Starting Mon 7/9/2018, Print               PDMP Review     None           ED Provider  Attending physically available and evaluated Memory Jalil  I managed the patient along with the ED Attending      Electronically Signed by         Chris Pena DO  01/12/23 7236

## 2023-01-11 NOTE — ED ATTENDING ATTESTATION
1/11/2023  IJeanie MD, saw and evaluated the patient  I have discussed the patient with the resident/non-physician practitioner and agree with the resident's/non-physician practitioner's findings, Plan of Care, and MDM as documented in the resident's/non-physician practitioner's note, except where noted  All available labs and Radiology studies were reviewed  I was present for key portions of any procedure(s) performed by the resident/non-physician practitioner and I was immediately available to provide assistance  At this point I agree with the current assessment done in the Emergency Department    I have conducted an independent evaluation of this patient a history and physical is as follows:  Patient presents for evaluation of left ankle injury she suffered an inversion injury when stepping off a curb she has unable to bear weight  No knee pain  Tenderness with swelling over the lateral malleoli are area and the anterior talofibular ligament there is no tenderness over the fifth metatarsal ulcers are intact sensation is intact  Knee is nontender  ED Course         Critical Care Time  Procedures

## 2023-01-12 NOTE — DISCHARGE INSTRUCTIONS
You have been evaluated in the Emergency Department today for left ankle pain  Your evaluation, including imaging, suggests that your symptoms are due to a non emergent cause  There was a questionable avulsion fracture, that will be officially read by radiology  Please use the air splint and crutches provided  Please also follow up with podiatry  They will call you  I also prescribed you naproxen for pain  This is at your pharmacy  Please take as prescribed  Return to the Emergency Department if you experience worsening uncontrolled pain, changes in sensation, or any other concerns  Thank you for choosing us for your care

## 2023-01-26 ENCOUNTER — OFFICE VISIT (OUTPATIENT)
Dept: PODIATRY | Facility: CLINIC | Age: 33
End: 2023-01-26

## 2023-01-26 VITALS
DIASTOLIC BLOOD PRESSURE: 78 MMHG | BODY MASS INDEX: 57.52 KG/M2 | HEIGHT: 60 IN | WEIGHT: 293 LBS | SYSTOLIC BLOOD PRESSURE: 140 MMHG | HEART RATE: 86 BPM

## 2023-01-26 DIAGNOSIS — M25.572 ACUTE LEFT ANKLE PAIN: ICD-10-CM

## 2023-01-26 DIAGNOSIS — S93.402A MODERATE LEFT ANKLE SPRAIN, INITIAL ENCOUNTER: Primary | ICD-10-CM

## 2023-01-26 RX ORDER — MELOXICAM 15 MG/1
15 TABLET ORAL DAILY
Qty: 30 TABLET | Refills: 1 | Status: SHIPPED | OUTPATIENT
Start: 2023-01-26

## 2023-01-26 NOTE — PATIENT INSTRUCTIONS
Ankle Exercises   AMBULATORY CARE:   What you need to know about ankle exercises: Ankle exercises help strengthen your ankle and improve its function after injury  These are beginning exercises  Ask your healthcare provider if you need to see a physical therapist for more advanced exercises  Do these exercises 3 to 5 days a week , or as directed by your healthcare provider  Ask if you should perform the exercises on each ankle  Do the exercises in the order that your healthcare provider recommends  This will help prevent swelling, chronic pain, and reinjury  Start with range of motion exercises  Then progress to strengthening exercises, and finally to balancing exercises  Warm up before you do ankle exercises  Walk or ride a stationary bike for 5 to 10 minutes to prepare your ankle for movement  Stop if you feel pain  It is normal to feel some discomfort at first  Regular exercise will help decrease your discomfort over time  How to perform range of motion exercises safely:  Begin with range of motion exercises to improve flexibility  Ask your healthcare provider when you can progress to strengthening exercises  Ankle alphabet:  Sit on a chair so that your feet do not touch the floor  Use your big toe to write each letter of the alphabet  Use only your foot and ankle, and keep your movements small  Do 2 sets  Calf stretches:      Sitting calf stretches with a towel:  Sit on the floor with both legs out straight in front of you  Loop a towel around the ball of your injured foot  Grasp the ends of the towel and pull it toward you  Keep your leg and back straight  Do not lean forward as you pull the towel  Hold for 30 seconds  Then relax for 30 seconds  Do 2 sets of 10  Standing calf stretches:  Stand facing a wall with the foot that is not injured forward and your knee slightly bent  Keep the leg with the injured foot straight and behind you with your toes pointed in slightly   With both heels flat on the floor, press your hips forward  Do not arch your back  Hold for 30 seconds, and then relax for 30 seconds  Do 2 sets of 10  Repeat with your leg bent  Do 2 sets of 10  How to perform strengthening exercises safely:  After you can perform range of motion exercises without pain, you may begin strengthening exercises  Ask your healthcare provider when you can progress to balancing exercises  Ankle movement in 4 directions:  Sit on the floor with your legs straight in front of you  Keep your heels on the floor for support  Dorsiflexion:  Begin with your toes pointing straight up  Pull your toes toward your body  Slowly return to the starting position  Do 3 sets of 5  Plantar flexion:  Begin with your toes pointing straight up  Push your toes away from your body  Slowly return to the starting position  Do 3 sets of 5  Inversion:  Begin with your toes pointing straight up  Push your toes inward, toward each other  Slowly return to the starting position  Do 3 sets of 5  Eversion:  Begin with your toes pointing straight up  Push your toes outward, away from each other  Slowly return to the starting position  Do 3 sets of 5  Toe curls with a towel:  Sit on a chair so that both of your feet are flat on the floor  Place a small towel on the floor in front of your injured foot  Grab the center of the towel with your toes and curl the towel toward you  Relax and repeat  Do 1 set of 5  Sturgeon pick-ups:  Sit on a chair so that both of your feet are flat on the floor  Place 20 marbles on the floor in front of your injured foot  Use your toes to  one marble at a time and place it into a bowl  Repeat until you have picked up all the marbles  Do 1 set  Heel raises:      Single leg heel raises:  Stand with your weight evenly on both feet  Hold on to a chair or a wall for balance   Lift the foot that is not injured off the floor so all your weight is placed on your injured foot  Raise the heel of your injured foot as high as you can  Slowly lower your heel to the floor  Do 1 set of 10  Double leg heel raises:  Stand with your weight evenly on both feet  Hold on to a chair or a wall for balance  Raise both of your heels as high as you can  Slowly lower your heels to the floor  Do 1 set of 10  Heel and toe walks:      Heel walks:  Begin in a standing position  Lift your toes off the floor and walk on your heels  Keep your toes lifted as high as possible  Do 2 sets of 10  Toe walks:  Begin in a standing position  Lift your heels off the floor and walk on the balls and toes of your feet  Keep your heels lifted as high as possible  Do 2 sets of 10  How to perform a balance exercise safely:  After you can perform strengthening exercises without pain, you may do this beginning balancing exercise  Ask your healthcare provider for more advanced balance exercises  Single leg stance:  Stand with your weight evenly on both feet, or hold on to a chair or a wall  Do not lean to the side  Lift the foot that is not injured off the floor so all your weight is placed on your injured foot  Balance on your injured foot  Ask your healthcare provider how long to hold this position  Contact your healthcare provider if:   Your pain becomes worse  You have new pain  You have questions or concerns about your condition, care, or exercise program     © Copyright [employer] 2022 Information is for End User's use only and may not be sold, redistributed or otherwise used for commercial purposes  All illustrations and images included in CareNotes® are the copyrighted property of A D A M [employer] Inc  or [facility] Rufus Arredondo   The above information is an  only  It is not intended as medical advice for individual conditions or treatments   Talk to your doctor, nurse or pharmacist before following any medical regimen to see if it is safe and effective for you

## 2023-01-26 NOTE — PROGRESS NOTES
Assessment/Plan:       Diagnoses and all orders for this visit:    Moderate left ankle sprain, initial encounter  -     meloxicam (Mobic) 15 mg tablet; Take 1 tablet (15 mg total) by mouth daily  -     Ambulatory referral to Physical Therapy; Future    Acute left ankle pain  -     Ambulatory Referral to Podiatry  -     meloxicam (Mobic) 15 mg tablet; Take 1 tablet (15 mg total) by mouth daily  -     Ambulatory referral to Physical Therapy; Future      Diagnosis and options discussed with patient  Patient agreeable to the plan as stated below  XR reviewed, no evidence of fracture    Stressed physical therapy  Provided home exercises and recommend formal PT    RICE protocol discussed    RTC 4-6 weeks    Daily antiinflammatory prescribed  Take with food    Subjective:      Patient ID: Jj Moreland is a 34 y o  female  PAtient fell 1/17/23 and roller her left ankle  She went to the ED  XR did not show anything but it still hurts  THe ankle is still very swollen  Walking makes it worse  She is in an air cast        The following portions of the patient's history were reviewed and updated as appropriate: allergies, current medications, past family history, past medical history, past social history, past surgical history and problem list     Review of Systems    Constitutional: Negative  HENT: Negative for sinus pressure and sinus pain  Respiratory: Negative for cough and shortness of breath  Cardiovascular: Negative for chest pain and leg swelling  Gastrointestinal: Negative for diarrhea, nausea and vomiting  Musculoskeletal: left ankle pain  Skin: Negative for rash or wound  Neurological: Negative for weakness, numbness and headaches  Psychiatric/Behavioral: The patient is not nervous/anxious  Objective:      /78   Pulse 86   Ht 5' (1 524 m)   Wt 136 kg (300 lb)   LMP 12/28/2022   BMI 58 59 kg/m²          Physical Exam  Vitals reviewed     Constitutional:       Appearance: She is obese  She is not ill-appearing or diaphoretic  Cardiovascular:      Rate and Rhythm: Normal rate  Pulses: Normal pulses  Pulmonary:      Effort: Pulmonary effort is normal  No respiratory distress  Musculoskeletal:      Right ankle: Swelling present  No deformity, ecchymosis or lacerations  Tenderness present over the lateral malleolus, ATF ligament and CF ligament  No posterior TF ligament, base of 5th metatarsal or proximal fibula tenderness  Normal range of motion  Anterior drawer test positive  Normal pulse  Right Achilles Tendon: Normal  No tenderness or defects  Sanchez's test negative  Neurological:      Mental Status: She is alert  XRay 3 views of the left ankle personally read by Dr Sofie Favre in office today and discussed with patient:  1  No evidence of fracture  2  Ankle mortise anatomic  3   No acute findings

## 2023-02-07 ENCOUNTER — EVALUATION (OUTPATIENT)
Dept: PHYSICAL THERAPY | Facility: OTHER | Age: 33
End: 2023-02-07

## 2023-02-07 DIAGNOSIS — M25.572 ACUTE LEFT ANKLE PAIN: ICD-10-CM

## 2023-02-07 DIAGNOSIS — S93.402A MODERATE LEFT ANKLE SPRAIN, INITIAL ENCOUNTER: ICD-10-CM

## 2023-02-07 NOTE — PROGRESS NOTES
PT Evaluation     Today's date: 2023  Patient name: Clara Rosas  : 1990  MRN: [id number]  Referring provider: BALAJI Rincon  Dx:   Encounter Diagnosis     ICD-10-CM    1  Acute left ankle pain  M25 [id number] Ambulatory referral to Physical Therapy      2  Moderate left ankle sprain, initial encounter  S93 [id number] Ambulatory referral to Physical Therapy          Start Time: 1645  Stop Time: 1715  Total time in clinic (min): 30 minutes    Assessment  Assessment details: Patient is a 34year old female who presents to physical therapy with referring diagnosis moderate left ankle sprain  The primary movement problem is pain with weightbearing, limiting patient ability to perform her ADL's, ambulate, and negotiate stairs  No further referral appears necessary at this time based upon examination results  I expect patient will improve in 6-8 weeks  The patient's greatest concern is getting back to fashion shows and perform ADL's without pain  Patient was provided with a customized home exercise program to begin performing on their own  All patient questions and concerns have been addressed at this time  Problem List:  1  L Ankle Swelling  2  L Ankle ROM limitation all planes  3  L Ankle weakness all directions    Impairments: abnormal coordination, abnormal gait, abnormal muscle firing, abnormal muscle tone, abnormal or restricted ROM, activity intolerance, impaired balance, impaired physical strength, lacks appropriate home exercise program, pain with function, weight-bearing intolerance and poor body mechanics    Symptom irritability: moderateUnderstanding of Dx/Px/POC: good   Prognosis: good    Goals  Short Term Goals:   1  Patient will be independent with a customized HEP  2  Patient will report at least 40% improvement overall with performance of ADL's, ambulating, and negotiating stairs  3  Patient will improve pain with activity by 50%    4  Patient will demonstrate decreased figure 8 left ankle swelling as measured within 0 5cm or better of contralateral ankle  5  Patient will demonstrate normalized L ankle ROM all planes  6  Patient will demonstrate 4+/5 MMT or better of L ankle musculature in all directions  Long Term Goals:   1  Patient will improve FOTO to greater than goal of 67   2  Patient will improve pain with activity to 2/10 or less  3  Patient will continue with HEP independence to allow for decreased future reoccurrence of pain and loss in function  4  Patient will report at least 80% improvement overall with performance of ADL's, ambulating, and negotiating stairs  5  Patient will return to fashion shows without pain or limitation  Plan  Plan details: Prognosis above is given PT services 2x/week tapering to 1x/week over the next 2-3 months and home program adherence  Patient would benefit from: PT eval and skilled physical therapy  Planned modality interventions: low level laser therapy, thermotherapy: hydrocollator packs and cryotherapy  Planned therapy interventions: neuromuscular re-education, therapeutic exercise, functional ROM exercises, graded exercise, home exercise program, therapeutic activities, strengthening, stretching, graded activity, flexibility, manual therapy, joint mobilization, balance/weight bearing training, activity modification, patient education and coordination  Frequency: 2x week  Duration in visits: 16  Duration in weeks: 12  Plan of Care beginning date: 2/7/2023  Plan of Care expiration date: 5/2/2023  Treatment plan discussed with: patient        Subjective Evaluation    History of Present Illness  Mechanism of injury: trauma  Mechanism of injury: Patient is a 34year old female presenting to physical therapy with chief complaint of left ankle pain following an ankle sprain  Patient reports this episode of pain/symptoms has been going on for about a month   She states she was getting into a car leading with her left LE when she excessively inverted her left ankle, leading to immediate pain and swelling  She reports having x-rays completed that did not reveal any evidence of fracture  Her goals are to return to fashion shows and perform her ADL's (walk, negotiate stairs) without pain  Quality of life: fair    Pain  Current pain ratin  At best pain ratin  At worst pain ratin  Location: Lateral Ankle, Distal achilles  Quality: dull ache, sharp and throbbing  Relieving factors: rest, support and relaxation  Aggravating factors: stair climbing, walking and standing    Exercise history: Enjoys fashion shows, walks      Diagnostic Tests  X-ray: normal  Treatments  Current treatment: medication  Current treatment comments: Anti-Inflammatory  Patient Goals  Patient goals for therapy: independence with ADLs/IADLs, decreased pain, decreased edema, increased strength, return to sport/leisure activities, improved balance and increased motion  Patient goal: Get back to walking around at fashion shows, walk and do stairs without pain        Objective    Observation: Swelling noted mostly surrounding L lateral malleolus  Palpation: TTP ATFL, CFL, General Lateral Ankle  Dermatome: (pinprick- L/R): All intact B/L LE        Figure 8 Edema Measure: L 61 cm, R 60 cm             GAIT: Decreased gait speed, shortened stride length  Squat assess: NP, Future Visit  Single leg Squat: NP, Future Visit  Singe Leg Stance: NP, Future Visit  Steps: Pain          MMT         AROM          PROM    Hip       L      R          L        R         L       R   ER          G  Max NV NV       G  Med NV NV       Iliop                     Knee         Extension 4+/5        Flexion 4+/5                 Ankle         Dorsi Flexion 2+/5 P 4+/5 0 WFL     Plantar Flexion 2+/5 P 4+/5 30 WFL     Inversion 2+/5 P  4+/5 5 WFL     Eversion 2+/5 4+/5 5 WFL                      Segmental mobility:   TCJ: Hypomobile               Precautions: Standard    Access Code: [email or web address]  URL: [email or web address]  Date: 02/07/2023  Prepared by: Justo Dwyer    Exercises  • Seated Ankle Alphabet - 2-3 x daily - 7 x weekly - 2 sets  • Seated Plantar Fascia Mobilization with Small Ball - 2-3 x daily - 7 x weekly - 2 sets - 1 minute hold  • Long Sitting Calf Stretch with Strap - 2-3 x daily - 7 x weekly - 10 reps - 10 second hold  • Long Sitting Soleus Stretch on Bolster with Strap - 2-3 x daily - 7 x weekly - 10 reps - 10 second hold    Manuals IE 2/7/23        Ankle PROM                                    Neuro Re-Ed         ABC's         Ankle Pumps         4 way ankle - banded         SLS                                    Ther Ex         Bike         Gastroc Stretch         Soleus Stretch         Leg Press                                             Ther Activity         Step Ups         Step Downs         Lateral Step Ups         Gait Training                           Modalities

## 2023-02-10 ENCOUNTER — APPOINTMENT (OUTPATIENT)
Dept: PHYSICAL THERAPY | Facility: OTHER | Age: 33
End: 2023-02-10

## 2023-02-17 ENCOUNTER — APPOINTMENT (OUTPATIENT)
Dept: PHYSICAL THERAPY | Facility: OTHER | Age: 33
End: 2023-02-17

## 2023-03-08 ENCOUNTER — HOSPITAL ENCOUNTER (EMERGENCY)
Facility: HOSPITAL | Age: 33
Discharge: HOME | End: 2023-03-08
Attending: EMERGENCY MEDICINE

## 2023-03-08 VITALS
TEMPERATURE: 97.4 F | DIASTOLIC BLOOD PRESSURE: 81 MMHG | HEART RATE: 64 BPM | OXYGEN SATURATION: 100 % | RESPIRATION RATE: 18 BRPM | SYSTOLIC BLOOD PRESSURE: 137 MMHG

## 2023-03-08 DIAGNOSIS — R03.0 ELEVATED BLOOD PRESSURE READING: ICD-10-CM

## 2023-03-08 DIAGNOSIS — R80.9 PROTEINURIA: ICD-10-CM

## 2023-03-08 DIAGNOSIS — M54.50 LOW BACK PAIN: Primary | ICD-10-CM

## 2023-03-08 LAB
BACTERIA UR QL AUTO: ABNORMAL /HPF
BILIRUB UR QL STRIP: NEGATIVE
CLARITY UR: ABNORMAL
COLOR UR: ABNORMAL
EXT PREGNANCY TEST URINE: NEGATIVE
EXT. CONTROL: NORMAL
GLUCOSE UR STRIP-MCNC: NEGATIVE MG/DL
HGB UR QL STRIP.AUTO: NEGATIVE
KETONES UR STRIP-MCNC: NEGATIVE MG/DL
LEUKOCYTE ESTERASE UR QL STRIP: NEGATIVE
MUCOUS THREADS UR QL AUTO: ABNORMAL
NITRITE UR QL STRIP: NEGATIVE
NON-SQ EPI CELLS URNS QL MICRO: ABNORMAL /HPF
PH UR STRIP.AUTO: 6.5 [PH]
PROT UR STRIP-MCNC: ABNORMAL MG/DL
RBC #/AREA URNS AUTO: ABNORMAL /HPF
SP GR UR STRIP.AUTO: 1.02 (ref 1–1.03)
TRANS CELLS #/AREA URNS HPF: PRESENT /[HPF]
UROBILINOGEN UR STRIP-ACNC: <2 MG/DL
WBC #/AREA URNS AUTO: ABNORMAL /HPF

## 2023-03-08 RX ORDER — ACETAMINOPHEN 325 MG/1
975 TABLET ORAL ONCE
Status: COMPLETED | OUTPATIENT
Start: 2023-03-08 | End: 2023-03-08

## 2023-03-08 RX ORDER — NAPROXEN 500 MG/1
500 TABLET ORAL 2 TIMES DAILY WITH MEALS
Qty: 20 TABLET | Refills: 0 | Status: SHIPPED | OUTPATIENT
Start: 2023-03-08 | End: 2023-03-18

## 2023-03-08 RX ORDER — CYCLOBENZAPRINE HCL 10 MG
10 TABLET ORAL 2 TIMES DAILY PRN
Qty: 20 TABLET | Refills: 0 | Status: SHIPPED | OUTPATIENT
Start: 2023-03-08 | End: 2023-03-18

## 2023-03-08 RX ORDER — METHOCARBAMOL 500 MG/1
500 TABLET, FILM COATED ORAL ONCE
Status: COMPLETED | OUTPATIENT
Start: 2023-03-08 | End: 2023-03-08

## 2023-03-08 RX ORDER — ACETAMINOPHEN 325 MG/1
650 TABLET ORAL EVERY 6 HOURS PRN
Qty: 40 TABLET | Refills: 0 | Status: SHIPPED | OUTPATIENT
Start: 2023-03-08 | End: 2023-03-18

## 2023-03-08 RX ORDER — KETOROLAC TROMETHAMINE 30 MG/ML
15 INJECTION, SOLUTION INTRAMUSCULAR; INTRAVENOUS ONCE
Status: DISCONTINUED | OUTPATIENT
Start: 2023-03-08 | End: 2023-03-08

## 2023-03-08 RX ORDER — KETOROLAC TROMETHAMINE 30 MG/ML
15 INJECTION, SOLUTION INTRAMUSCULAR; INTRAVENOUS ONCE
Status: COMPLETED | OUTPATIENT
Start: 2023-03-08 | End: 2023-03-08

## 2023-03-08 RX ORDER — LIDOCAINE 50 MG/G
1 PATCH TOPICAL ONCE
Status: DISCONTINUED | OUTPATIENT
Start: 2023-03-08 | End: 2023-03-08 | Stop reason: HOSPADM

## 2023-03-08 RX ADMIN — ACETAMINOPHEN 975 MG: 325 TABLET ORAL at 13:22

## 2023-03-08 RX ADMIN — METHOCARBAMOL 500 MG: 500 TABLET ORAL at 12:10

## 2023-03-08 RX ADMIN — LIDOCAINE 5% 1 PATCH: 700 PATCH TOPICAL at 12:10

## 2023-03-08 RX ADMIN — KETOROLAC TROMETHAMINE 15 MG: 30 INJECTION, SOLUTION INTRAMUSCULAR; INTRAVENOUS at 12:39

## 2023-03-08 NOTE — ED PROVIDER NOTES
History  Chief Complaint   Patient presents with   • Back Pain     2 days, appointment with chiropractor on Friday     Jignesh Henley is a 34year old female with no pertinent PMHx presenting to the ED for low back pain x 2-3 days  She spoke with her PCP who referred her to chiropractics due to muscle spasms but patient cannot get an appointment for two days  The back pain is located on her lower lumbar spine and radiates down bilateral lower extremity above the knees with associated paresthesias  Pressure exacerbates the paresthesias and is making it difficulty to sleep  Tried aspirin and icey hot cream over the area without relief  Was doing physical therapy due to ankle injury where she was limping for a while, but states she hasn't recently been experiencing sharp ankle symptoms  Denies urinary symptoms, bowel/bladder incontinence, saddle paresthesias, IV drug use, fevers, chest pain, shortness of breath  She has never experienced this before  Is able to walk and feel her legs  She endorses drinking a lot of iced green tea  Prior to Admission Medications   Prescriptions Last Dose Informant Patient Reported? Taking?    cyclobenzaprine (FLEXERIL) 10 mg tablet   No No   Sig: Take 1 tablet (10 mg total) by mouth daily at bedtime for 5 days   diphenhydrAMINE (BENADRYL) 25 mg capsule   No No   Sig: Take 1 capsule (25 mg total) by mouth every 6 (six) hours as needed for itching for up to 3 days   ibuprofen (MOTRIN) 600 mg tablet   No No   Sig: Take 1 tablet (600 mg total) by mouth every 6 (six) hours as needed for mild pain for up to 5 days   lidocaine (LIDODERM) 5 %   No No   Sig: Place 1 patch on the skin daily Remove & Discard patch within 12 hours or as directed by MD   Patient not taking: Reported on 1/26/2023   meloxicam (Mobic) 15 mg tablet Not Taking  No No   Sig: Take 1 tablet (15 mg total) by mouth daily   Patient not taking: Reported on 3/8/2023      Facility-Administered Medications: None     History reviewed  No pertinent past medical history  Past Surgical History:   Procedure Laterality Date   • DILATION AND CURETTAGE OF UTERUS       History reviewed  No pertinent family history  I have reviewed and agree with the history as documented  E-Cigarette/Vaping     E-Cigarette/Vaping Substances     Social History     Tobacco Use   • Smoking status: Former   • Smokeless tobacco: Never   Substance Use Topics   • Alcohol use: No   • Drug use: No     Review of Systems   Constitutional: Negative for chills, diaphoresis, fatigue and fever  HENT: Negative for congestion, ear pain, facial swelling, hearing loss, rhinorrhea, sinus pressure, sore throat, tinnitus and trouble swallowing  Eyes: Negative for photophobia and visual disturbance  Respiratory: Negative for cough, chest tightness and shortness of breath  Cardiovascular: Negative for chest pain and palpitations  Gastrointestinal: Negative for abdominal pain, blood in stool, constipation, diarrhea, nausea, rectal pain and vomiting  Genitourinary: Positive for flank pain  Negative for decreased urine volume, difficulty urinating, dysuria, frequency, hematuria, pelvic pain, urgency, vaginal discharge and vaginal pain  Musculoskeletal: Positive for back pain  Negative for gait problem, joint swelling, myalgias, neck pain and neck stiffness  Skin: Negative for rash and wound  Neurological: Negative for dizziness, facial asymmetry, weakness, light-headedness and headaches  Psychiatric/Behavioral: Positive for sleep disturbance  Physical Exam  Physical Exam  Vitals reviewed  Constitutional:       General: She is not in acute distress  Appearance: Normal appearance  She is ill-appearing  She is not toxic-appearing or diaphoretic  Comments: Patient is unable to get comfortable  Sometimes is sitting, sometimes sitting on the bed  Neither position is providing relief for symptoms  HENT:      Head: Normocephalic and atraumatic  Right Ear: External ear normal       Left Ear: External ear normal       Nose: Nose normal       Mouth/Throat:      Mouth: Mucous membranes are moist       Pharynx: Oropharynx is clear  No oropharyngeal exudate or posterior oropharyngeal erythema  Eyes:      General:         Right eye: No discharge  Left eye: No discharge  Extraocular Movements: Extraocular movements intact  Conjunctiva/sclera: Conjunctivae normal       Pupils: Pupils are equal, round, and reactive to light  Cardiovascular:      Rate and Rhythm: Normal rate and regular rhythm  Pulses: Normal pulses  Popliteal pulses are 2+ on the right side and 2+ on the left side  Dorsalis pedis pulses are 2+ on the right side and 2+ on the left side  Posterior tibial pulses are 2+ on the right side and 2+ on the left side  Heart sounds: Normal heart sounds  Pulmonary:      Effort: Pulmonary effort is normal       Breath sounds: Normal breath sounds  Chest:      Chest wall: No tenderness  Abdominal:      General: Bowel sounds are normal       Palpations: Abdomen is soft  Tenderness: There is no abdominal tenderness  There is right CVA tenderness  There is no left CVA tenderness or guarding  Musculoskeletal:         General: Normal range of motion  Cervical back: Normal, normal range of motion and neck supple  No rigidity or tenderness  Normal range of motion  Thoracic back: Normal       Lumbar back: Tenderness present  No swelling, deformity or signs of trauma  Normal range of motion  Back:       Comments: ROM intact but notable pain to spinal flexion  CVA tenderness, but could be muscular spasms  Reproducible pain to light palpation of right flank  Lymphadenopathy:      Cervical: No cervical adenopathy  Skin:     General: Skin is warm and dry  Capillary Refill: Capillary refill takes less than 2 seconds  Neurological:      General: No focal deficit present        Mental Status: She is alert  Sensory: Sensation is intact  Motor: Motor function is intact  Coordination: Coordination is intact  Gait: Gait is intact  Comments: Bilateral lower extremities: 2+ DP and PT pulses, no color change, temperature unremarkable  5/5 strength  Sensation intact      Psychiatric:         Attention and Perception: Attention normal          Speech: Speech normal          Behavior: Behavior normal        Vital Signs  ED Triage Vitals   Temperature Pulse Respirations Blood Pressure SpO2   03/08/23 1130 03/08/23 1130 03/08/23 1130 03/08/23 1130 03/08/23 1130   (!) 97 4 °F (36 3 °C) 90 18 (!) 140/111 100 %      Temp Source Heart Rate Source Patient Position - Orthostatic VS BP Location FiO2 (%)   03/08/23 1130 03/08/23 1241 03/08/23 1130 03/08/23 1130 --   Tympanic Monitor Sitting Right arm       Pain Score       03/08/23 1130       10 - Worst Possible Pain         Vitals:    03/08/23 1130 03/08/23 1241   BP: (!) 140/111 137/81   Pulse: 90 64   Patient Position - Orthostatic VS: Sitting Lying     Visual Acuity    ED Medications  Medications   lidocaine (LIDODERM) 5 % patch 1 patch (1 patch Topical Medication Applied 3/8/23 1210)   methocarbamol (ROBAXIN) tablet 500 mg (500 mg Oral Given 3/8/23 1210)   ketorolac (TORADOL) injection 15 mg (15 mg Intramuscular Given 3/8/23 1239)   acetaminophen (TYLENOL) tablet 975 mg (975 mg Oral Given 3/8/23 1322)       Diagnostic Studies  Results Reviewed     Procedure Component Value Units Date/Time    Urine Microscopic [[id number]]  (Abnormal) Collected: 03/08/23 1219    Lab Status: Final result Specimen: Urine, Clean Catch Updated: 03/08/23 1258     RBC, UA None Seen /hpf      WBC, UA 1-2 /hpf      Epithelial Cells Occasional /hpf      Bacteria, UA None Seen /hpf      MUCUS THREADS Occasional     Transitional Epithelial Cells Present    UA w Reflex to Microscopic w Reflex to Culture [[id number]]  (Abnormal) Collected: 03/08/23 1219    Lab Status: Final result Specimen: Urine, Clean Catch Updated: 03/08/23 1252     Color, UA Light Yellow     Clarity, UA Turbid     Specific Gravity, UA 1 021     pH, UA 6 5     Leukocytes, UA Negative     Nitrite, UA Negative     Protein, UA Trace mg/dl      Glucose, UA Negative mg/dl      Ketones, UA Negative mg/dl      Urobilinogen, UA <2 0 mg/dl      Bilirubin, UA Negative     Occult Blood, UA Negative    POCT pregnancy, urine [[id number]]  (Normal) Resulted: 03/08/23 1226    Lab Status: Final result Updated: 03/08/23 1226     EXT Preg Test, Ur Negative     Control Valid             No orders to display          Procedures  Procedures     ED Course  ED Course as of 03/08/23 1401   Wed Mar 08, 2023   1253 UA without leukocytes, nitrites, signs of infection  Negative occult or christiano blood  Trace protein noted  1259 Patient reports continued pain but is laying down on her bed still now     1301 Urine microscopic without RBC and bacteria  SBIRT 22yo+    Flowsheet Row Most Recent Value   SBIRT (25 yo +)    In order to provide better care to our patients, we are screening all of our patients for alcohol and drug use  Would it be okay to ask you these screening questions? Yes Filed at: 03/08/2023 1217   Initial Alcohol Screen: US AUDIT-C     1  How often do you have a drink containing alcohol? 0 Filed at: 03/08/2023 1217   2  How many drinks containing alcohol do you have on a typical day you are drinking? 0 Filed at: 03/08/2023 1217   3b  FEMALE Any Age, or MALE 65+: How often do you have 4 or more drinks on one occassion? 0 Filed at: 03/08/2023 1217   Audit-C Score 0 Filed at: 03/08/2023 1217   BREANNA: How many times in the past year have you    Used an illegal drug or used a prescription medication for non-medical reasons? Never Filed at: 03/08/2023 1217                  Medical Decision Making  34year old female no PMHx presenting for lower back pain x 2-3 days   Denies trauma recent or history  Denies urinary symptoms  Has an appointment with her chiropractor in two days  No red flag signs/symptoms to include fevers, chills, intravenous drug use, bowel/bladder incontinence, saddle paresthesias, neurological deficit  Physical exam remarkable for lower lumbar tenderness midline with associated right flank pain to palpation  Is neurovascularly intact  Urine pregnancy negative, provided Toradol 15 mg IM, Robaxin 500 mg, and applied a lidocaine pain patch over the area of pain  UA resulted with tace protein but negative occult blood, nitrites, leukocytes, signs for infection  Urine microscopic with no bacteria or RBC  Suspect CVA tenderness to be muscular in nature  Checked on patient forty minutes after Robaxin and lidocaine patch, about fifteen minutes after Toradol injection, patient is seen resting on the bed without needing to move around for comfort as initial, but reports continued pain  Tylenol 975 mg provided for further relief  No trauma, no history of back pain prior to symptom onset, and no red flag signs/symptoms so I deferred radiology studies  At time of discharge patient reports having better controlled pain relief  Patient appears more comfortable and vitals are stable  Patient is not taking the Mobic prescription, I wrote a prescription for Naproxen for pain relief, Tylenol, Flexeril, and a referral to comprehensive spine  Discussed with patient the OTC option for Salonpas  Provided opportunity for questions throughout the encounter and were answered thoroughly with good understanding  Strict protocol instructions provided regarding return to the ED  Low back pain: acute illness or injury  Amount and/or Complexity of Data Reviewed  External Data Reviewed: labs, radiology and notes  Labs: ordered  Decision-making details documented in ED Course  Details: Urine preg, UA      Risk  Prescription drug management          Disposition  Final diagnoses:   Elevated blood pressure reading   Low back pain   Proteinuria     Time reflects when diagnosis was documented in both MDM as applicable and the Disposition within this note     Time User Action Codes Description Comment    3/8/2023  1:12 PM Braydon Favre Add [R03 0] Elevated blood pressure reading     3/8/2023  1:19 PM Dorsie Favre Add [M54 50] Low back pain     3/8/2023  1:19 PM Dorsie Favre Modify [R03 0] Elevated blood pressure reading     3/8/2023  1:19 PM Dorslalo Favre Modify [M54 50] Low back pain     3/8/2023  1:21 PM Dorsie Favre Add [R80 9] Proteinuria       ED Disposition     ED Disposition   Discharge    Condition   Stable    Date/Time   Wed Mar 8, 2023  1:28 PM    Comment   Luis Hope discharge to home/self care                 Follow-up Information     Follow up With Specialties Details Why Contact Info Additional Information    [facility] Physical Therapy Schedule an appointment as soon as possible for a visit today  [phone]    [facility] [address] Emergency Department Emergency Medicine Go to  If symptoms worsen [address] [facility], [address] [place] [address], [address] [facility]          Patient's Medications   Discharge Prescriptions    ACETAMINOPHEN (TYLENOL) 325 MG TABLET    Take 2 tablets (650 mg total) by mouth every 6 (six) hours as needed for mild pain or moderate pain for up to 10 days       Start Date: 3/8/2023  End Date: 3/18/2023       Order Dose: 650 mg       Quantity: 40 tablet    Refills: 0    CYCLOBENZAPRINE (FLEXERIL) 10 MG TABLET    Take 1 tablet (10 mg total) by mouth 2 (two) times a day as needed for muscle spasms for up to 10 days       Start Date: 3/8/2023  End Date: 3/18/2023       Order Dose: 10 mg       Quantity: 20 tablet    Refills: 0    NAPROXEN (NAPROSYN) 500 MG TABLET    Take 1 tablet (500 mg total) by mouth 2 (two) times a day with meals for 10 days       Start Date: 3/8/2023  End Date: 3/18/2023       Order Dose: 500 mg       Quantity: 20 tablet    Refills: 0         PDMP Review     None        ED Provider  Electronically Signed by           Latrice Aly PA-C  03/08/23 1644

## 2023-03-08 NOTE — DISCHARGE INSTRUCTIONS
1 - I have referred you to comprehensive spine for further evaluation/treatment  You can call for an appointment or they will call you to touch [facility] to schedule an appointment  2 - Take Naproxen twice daily as needed for pain relief  Do not combine this with other NSAIDs such as ibuprofen, also do not recommend taking while taking aspirin  I have also provided a prescription for Tylenol  If planning on using both medications, rotate the doses as every other  3 - I have prescribed a muscle relaxer, Flexeril, you may take this twice daily as needed  This may make you drowsy, please do not drive while taking this medication  4 - Over the counter Salonpas pain patches may help provide direct relief where the pain is  May also use heating pads applied to the area of pain  Introduce light stretching  5 - Make an appointment with your primary care provider to follow up the elevated blood pressure readings and the protein in your urine  6 - Return to the ED if you develop: fevers, chills, passing out, loss of sensation to your legs, unable to walk, extreme pain, urinary symptoms, blood in urine, bowel or bladder incontinence, numbness in your inner thigh area

## 2023-03-09 ENCOUNTER — TELEPHONE (OUTPATIENT)
Dept: PHYSICAL THERAPY | Facility: OTHER | Age: 33
End: 2023-03-09

## 2023-03-09 ENCOUNTER — NURSE TRIAGE (OUTPATIENT)
Dept: PHYSICAL THERAPY | Facility: OTHER | Age: 33
End: 2023-03-09

## 2023-03-09 NOTE — TELEPHONE ENCOUNTER
Additional Information  • Negative: Has the patient had unexplained weight loss? • Negative: Does the patient have a fever? • Negative: Is the patient experiencing blood in sputum? • Negative: Has the patient experienced major trauma? (fall from height, high speed collision, direct blow to spine) and is also experiencing nausea, light-headedness, or loss of consciousness? • Negative: Is the patient experiencing urine retention? • Negative: Is this a chronic condition? • Negative: Is the patient experiencing acute drop foot or paralysis? "It's starting to"! Patient unable to provide a definitive answer  This RN stated she could test her in a few seconds if she were capable of being seen  Brief description given to r/o and reasons why  Nurse kindly reminded the CS staff attempting to connect her with clinician for evaluation  Protocols used: [facility] COMPREHENSIVE SPINE PROGRAM PROTOCOL    This nurse reached out to patient for completion of triage  Nurse briefly reviewed and reminded she will be referred to schedule a thorough evaluation  Patient stated she is in "excruiating pain" and "cannot move"  Nurse stated she would proceed with the triage questions for referral placement, but reminded of RTED s/s and PCP F/U  Patient became upset when questioned on RF s/s- Specifically the Acute drop Foot  Nurse described and stated this was almost certainly addressed/tested for at ED visit yesterday  Nurse could quickly review neuro/exam notes from that visit and if no changes since then it would be "NO"  Nurse again stated she did not want to "hold up" our triage, but it was important to confirm drop foot status  Patient stated, "I can't do nothing  I'm laying in bed  I can't move and in so much pain"  Patient offered apologies and stated, "Ma'am I need to take my meds and call back because I just can't do this right now"  Nurse stated she understood and triage should only take a few minutes   Requested she CB when able  Patient stated, "I'm sorry" and disconnected call  Will await possible CB to move forward with triage

## 2023-03-09 NOTE — TELEPHONE ENCOUNTER
Additional Information  • Negative: Is this related to a work injury? • Negative: Is this related to an MVA? • Negative: Are you currently recieving homecare services? Background - Initial Assessment  Clinical complaint: ED visit on 03/08/23 due to Low back pain  Patient states pain radiates to B/L hips elina to her entire legs  The pain started 2-3 days ago and never had this pain before  No numbness but she feels tingling on her legs  NKI   Seeing PCP who referred her to Chiropractor, patient has an appt tomorrow 03/09/23  The pain is constant "non-stop"   Patient described the pain as sharp, "excruciated pain" "it almost feel if back/pelvic is been pull apart"     Date of onset: 2-3 days ago (03/05 -03/06)  Frequency of pain: constant  Quality of pain: sharp and tingling    Protocols used: [facility] COMPREHENSIVE SPINE PROGRAM PROTOCOL

## 2023-03-09 NOTE — TELEPHONE ENCOUNTER
Call placed to the patient per [facility] referral     V/M left for the patient to call  Phone number and hours of business provided  This is the 1st attempt to reach the patient  Will defer per protocol

## 2023-03-10 NOTE — TELEPHONE ENCOUNTER
Please see previous notes and partial TRIAGE associated to this encounter documentation  Patient was seen in ED 3/8/23 (See Notes)  [facility] referral entered d/t Acute LBP complaints  Triaged initiated by  CS staff  Patient asked this nurse if she could CB to complete the questions as noted in encounter for 3/9/23  Nurse agreed and deferred patient's referral   No call back received as of yet to proceed/complete the triage  Referral closed per protocol and will await possible CB from patient

## 2023-09-06 ENCOUNTER — APPOINTMENT (EMERGENCY)
Dept: RADIOLOGY | Facility: HOSPITAL | Age: 33
End: 2023-09-06
Payer: COMMERCIAL

## 2023-09-06 ENCOUNTER — HOSPITAL ENCOUNTER (EMERGENCY)
Facility: HOSPITAL | Age: 33
Discharge: HOME | End: 2023-09-07
Attending: EMERGENCY MEDICINE
Payer: COMMERCIAL

## 2023-09-06 DIAGNOSIS — R10.9 ABDOMINAL PAIN: Primary | ICD-10-CM

## 2023-09-06 LAB
BACTERIA UR QL AUTO: ABNORMAL /HPF
BILIRUB UR QL STRIP: NEGATIVE
CLARITY UR: ABNORMAL
COLOR UR: YELLOW
EXT PREGNANCY TEST URINE: NEGATIVE
EXT. CONTROL: NORMAL
GLUCOSE UR STRIP-MCNC: NEGATIVE MG/DL
HGB UR QL STRIP.AUTO: ABNORMAL
KETONES UR STRIP-MCNC: NEGATIVE MG/DL
LEUKOCYTE ESTERASE UR QL STRIP: NEGATIVE
MUCOUS THREADS UR QL AUTO: ABNORMAL
NITRITE UR QL STRIP: NEGATIVE
NON-SQ EPI CELLS URNS QL MICRO: ABNORMAL /HPF
PH UR STRIP.AUTO: 5.5 [PH]
PROT UR STRIP-MCNC: ABNORMAL MG/DL
RBC #/AREA URNS AUTO: ABNORMAL /HPF
SP GR UR STRIP.AUTO: 1.02 (ref 1–1.03)
UROBILINOGEN UR STRIP-ACNC: <2 MG/DL
WBC #/AREA URNS AUTO: ABNORMAL /HPF

## 2023-09-06 PROCEDURE — 81025 URINE PREGNANCY TEST: CPT

## 2023-09-06 PROCEDURE — 93005 ELECTROCARDIOGRAM TRACING: CPT

## 2023-09-06 PROCEDURE — 96374 THER/PROPH/DIAG INJ IV PUSH: CPT

## 2023-09-06 PROCEDURE — G1004 CDSM NDSC: HCPCS

## 2023-09-06 PROCEDURE — 74176 CT ABD & PELVIS W/O CONTRAST: CPT

## 2023-09-06 PROCEDURE — 81001 URINALYSIS AUTO W/SCOPE: CPT

## 2023-09-06 PROCEDURE — 99284 EMERGENCY DEPT VISIT MOD MDM: CPT

## 2023-09-06 PROCEDURE — 99284 EMERGENCY DEPT VISIT MOD MDM: CPT | Performed by: EMERGENCY MEDICINE

## 2023-09-06 RX ORDER — KETOROLAC TROMETHAMINE 30 MG/ML
15 INJECTION, SOLUTION INTRAMUSCULAR; INTRAVENOUS ONCE
Status: DISCONTINUED | OUTPATIENT
Start: 2023-09-06 | End: 2023-09-06

## 2023-09-06 RX ORDER — KETOROLAC TROMETHAMINE 30 MG/ML
15 INJECTION, SOLUTION INTRAMUSCULAR; INTRAVENOUS ONCE
Status: COMPLETED | OUTPATIENT
Start: 2023-09-06 | End: 2023-09-06

## 2023-09-06 RX ADMIN — KETOROLAC TROMETHAMINE 15 MG: 30 INJECTION, SOLUTION INTRAMUSCULAR; INTRAVENOUS at 23:21

## 2023-09-06 NOTE — Clinical Note
Vivianamasha Hensley was seen and treated in our emergency department on 9/6/2023.    ?    ? ? Diagnosis: ?    Babara Leak  ? Trini Prasad She may return on this date: 09/08/2023    ? If you have any questions or concerns, please don't hesitate to call.       Ulysses Anne, DO    ______________________________           _______________          _______________  Hospital Representative                              Date                                Time

## 2023-09-07 VITALS
RESPIRATION RATE: 20 BRPM | OXYGEN SATURATION: 100 % | TEMPERATURE: 98.5 F | SYSTOLIC BLOOD PRESSURE: 135 MMHG | HEART RATE: 113 BPM | DIASTOLIC BLOOD PRESSURE: 80 MMHG

## 2023-09-07 LAB
ATRIAL RATE: 105 BPM
P AXIS: 55 DEGREES
PR INTERVAL: 144 MS
QRS AXIS: 81 DEGREES
QRSD INTERVAL: 88 MS
QT INTERVAL: 354 MS
QTC INTERVAL: 467 MS
T WAVE AXIS: -15 DEGREES
VENTRICULAR RATE: 105 BPM

## 2023-09-07 PROCEDURE — 93010 ELECTROCARDIOGRAM REPORT: CPT | Performed by: INTERNAL MEDICINE

## 2023-09-07 NOTE — ED ATTENDING ATTESTATION
9/6/2023  IGiovani MD, saw and evaluated the patient. I have discussed the patient with the resident/non-physician practitioner and agree with the resident's/non-physician practitioner's findings, Plan of Care, and MDM as documented in the resident's/non-physician practitioner's note, except where noted. All available labs and Radiology studies were reviewed. I was present for key portions of any procedure(s) performed by the resident/non-physician practitioner and I was immediately available to provide assistance. At this point I agree with the current assessment done in the Emergency Department. I have conducted an independent evaluation of this patient a history and physical is as follows:    ED Course  ED Course as of 09/06/23 2326   Wed Sep 06, 2023   2259 Per resident h&p 27 YO F presents with RLQ abdominal pain no h/o abd operations; h/o ureteral colic no hematuria or dysuria; +sexually active; no fevers or chills O: tender RLQ soft abd I/P UA; hCG; CTAP ureteral stone prootocol; ketorolac     Emergency Department Note- Candice Espitia 28 y.o. female MRN: [id number]    Unit/Bed#: ED 29 Encounter: [id number]    Candice Espitia is a 28 y.o. female who presents with   Chief Complaint   Patient presents with   • Abdominal Pain     Pt reports lower abdominal pain that radiates to back since last night. History of Present Illness   HPI:  Candice Espitia is a 28 y.o. female who presents for evaluation of:  Right flank pain that radiates to her right abdomen intermittent since last night but worse this evening. Patient denies associated hematuria and dysuria. Patient has a history of ureteral colic but tonight symptoms do feel somewhat different than her prior episode of ureteral colic. She denies associated fevers and chills. She denies associated nausea and vomiting. She has not taken anything at home for her discomfort.   Patient's last menstrual period was 08/30/2023 (approximate). Review of Systems    Historical Information   No past medical history on file. Past Surgical History:   Procedure Laterality Date   • DILATION AND CURETTAGE OF UTERUS       Social History   Social History     Substance and Sexual Activity   Alcohol Use No     Social History     Substance and Sexual Activity   Drug Use No     Social History     Tobacco Use   Smoking Status Former   Smokeless Tobacco Never     Family History: No family history on file. Meds/Allergies   PTA meds:   Prior to Admission Medications   Prescriptions Last Dose Informant Patient Reported? Taking?    EPINEPHrine (EPIPEN) 0.3 mg/0.3 mL SOAJ   No No   Sig: Inject 0.3 mL (0.3 mg total) into a muscle once for 1 dose   cyclobenzaprine (FLEXERIL) 10 mg tablet   No No   Sig: Take 1 tablet (10 mg total) by mouth 2 (two) times a day as needed for muscle spasms for up to 10 days   meloxicam (Mobic) 15 mg tablet   No No   Sig: Take 1 tablet (15 mg total) by mouth daily   Patient not taking: Reported on 3/8/2023   naproxen (Naprosyn) 500 mg tablet   No No   Sig: Take 1 tablet (500 mg total) by mouth 2 (two) times a day with meals for 10 days      Facility-Administered Medications: None     Allergies   Allergen Reactions   • Wasp Venom Swelling     Local significant swelling and erythema       Objective   First Vitals:   Blood Pressure: 160/100 (09/06/23 2225)  Pulse: (!) 113 (09/06/23 2223)  Temperature: 98.5 °F (36.9 °C) (09/06/23 2223)  Temp Source: Oral (09/06/23 2223)  Respirations: 20 (09/06/23 2223)  SpO2: 100 % (09/06/23 2223)    Current Vitals:   Blood Pressure: 160/100 (09/06/23 2225)  Pulse: (!) 113 (09/06/23 2223)  Temperature: 98.5 °F (36.9 °C) (09/06/23 2223)  Temp Source: Oral (09/06/23 2223)  Respirations: 20 (09/06/23 2223)  SpO2: 100 % (09/06/23 2223)    No intake or output data in the 24 hours ending 09/06/23 2327    Invasive Devices     Peripheral Intravenous Line  Duration           Peripheral IV 09/06/23 Right Antecubital <1 day                Physical Exam  Vitals and nursing note reviewed. Constitutional:       General: She is not in acute distress. Appearance: Normal appearance. She is well-developed. HENT:      Head: Normocephalic and atraumatic. Right Ear: External ear normal.      Left Ear: External ear normal.      Nose: Nose normal.      Mouth/Throat:      Pharynx: No oropharyngeal exudate. Eyes:      Conjunctiva/sclera: Conjunctivae normal.      Pupils: Pupils are equal, round, and reactive to light. Cardiovascular:      Rate and Rhythm: Normal rate and regular rhythm. Pulmonary:      Effort: Pulmonary effort is normal. No respiratory distress. Abdominal:      General: Abdomen is flat. There is no distension. Palpations: Abdomen is soft. Musculoskeletal:         General: No deformity. Normal range of motion. Cervical back: Normal range of motion and neck supple. Skin:     General: Skin is warm and dry. Capillary Refill: Capillary refill takes less than 2 seconds. Neurological:      General: No focal deficit present. Mental Status: She is alert and oriented to person, place, and time. Mental status is at baseline. Coordination: Coordination normal.   Psychiatric:         Mood and Affect: Mood normal.         Behavior: Behavior normal.         Thought Content: Thought content normal.         Judgment: Judgment normal.           Medical Decision Makin. Acute right flank pain rating to her right abdomen: CT scan abdomen and pelvis ureteral stone protocol rule out ureteral colic; urinalysis rule out UTI; urine hCG rule out pregnancy; ECG rule out arrhythmia.     Recent Results (from the past 36 hour(s))   ECG 12 lead    Collection Time: 23 10:25 PM   Result Value Ref Range    Ventricular Rate 105 BPM    Atrial Rate 105 BPM    KS Interval 144 ms    QRSD Interval 88 ms    QT Interval 354 ms    QTC Interval 467 ms    P [place] 55 degrees    QRS Axis 81 degrees T Wave Axis -15 degrees   POCT pregnancy, urine    Collection Time: 09/06/23 11:16 PM   Result Value Ref Range    EXT Preg Test, Ur Negative     Control Valid      CT renal stone study abdomen pelvis wo contrast    (Results Pending)         Portions of the record may have been created with voice recognition software. Occasional wrong word or "sound a like" substitutions may have occurred due to the inherent limitations of voice recognition software. Read the chart carefully and recognize, using context, where substitutions have occurred.           Critical Care Time  Procedures

## 2023-09-07 NOTE — DISCHARGE INSTRUCTIONS
You were seen in the Emergency Department today for abdominal pain. Please follow up with your primary care doctor in 1-2 days. Please return to the Emergency Department if you experience worsening of your current symptoms, fever, inability to tolerate fluids, or any other concerning symptoms.

## 2023-09-07 NOTE — ED PROVIDER NOTES
History  Chief Complaint   Patient presents with   • Abdominal Pain     Pt reports lower abdominal pain that radiates to back since last night. HPI     Patient is a 26-year-old female with no significant past medical history who presented with abdominal pain. Patient states that she has been experiencing right lower quadrant abdominal pain that radiates to her back since last night. She was laying down when it started. The pain is intermittent and lasts about 20 seconds before it resolves. It is sharp and stabbing. She does not have a history of abdominal surgery. She has a history of kidney stones and is unsure if it feels similar. She did not take any medications for it. Last menstrual period was last week and was normal.  She is sexually active. She denies history of ovarian cysts or STI. She denies dysuria or hematuria. Prior to Admission Medications   Prescriptions Last Dose Informant Patient Reported? Taking? EPINEPHrine (EPIPEN) 0.3 mg/0.3 mL SOAJ   No No   Sig: Inject 0.3 mL (0.3 mg total) into a muscle once for 1 dose   cyclobenzaprine (FLEXERIL) 10 mg tablet   No No   Sig: Take 1 tablet (10 mg total) by mouth 2 (two) times a day as needed for muscle spasms for up to 10 days   meloxicam (Mobic) 15 mg tablet   No No   Sig: Take 1 tablet (15 mg total) by mouth daily   Patient not taking: Reported on 3/8/2023   naproxen (Naprosyn) 500 mg tablet   No No   Sig: Take 1 tablet (500 mg total) by mouth 2 (two) times a day with meals for 10 days      Facility-Administered Medications: None       No past medical history on file. Past Surgical History:   Procedure Laterality Date   • DILATION AND CURETTAGE OF UTERUS         No family history on file. I have reviewed and agree with the history as documented. E-Cigarette/Vaping     E-Cigarette/Vaping Substances     Social History     Tobacco Use   • Smoking status: Former   • Smokeless tobacco: Never   Substance Use Topics   • Alcohol use:  No • Drug use: No        Review of Systems   Constitutional: Negative for chills and fever. HENT: Negative for ear pain and sore throat. Eyes: Negative for photophobia and visual disturbance. Respiratory: Negative for cough and shortness of breath. Cardiovascular: Negative for chest pain and palpitations. Gastrointestinal: Positive for abdominal pain. Negative for vomiting. Genitourinary: Negative for dysuria and hematuria. Musculoskeletal: Positive for back pain. Negative for neck pain. Skin: Negative for rash and wound. Neurological: Negative for syncope and headaches. All other systems reviewed and are negative. Physical Exam  ED Triage Vitals   Temperature Pulse Respirations Blood Pressure SpO2   09/06/23 2223 09/06/23 2223 09/06/23 2223 09/06/23 2225 09/06/23 2223   98.5 °F (36.9 °C) (!) 113 20 160/100 100 %      Temp Source Heart Rate Source Patient Position - Orthostatic VS BP Location FiO2 (%)   09/06/23 2223 -- -- -- --   Oral          Pain Score       09/06/23 2223       6             Orthostatic Vital Signs  Vitals:    09/06/23 2223 09/06/23 2225 09/07/23 0129   BP:  160/100 135/80   Pulse: (!) 113         Physical Exam  Vitals and nursing note reviewed. Constitutional:       General: She is not in acute distress. Appearance: She is well-developed. HENT:      Head: Normocephalic and atraumatic. Nose: No congestion or rhinorrhea. Mouth/Throat:      Mouth: Mucous membranes are moist.   Eyes:      Extraocular Movements: Extraocular movements intact. Conjunctiva/sclera: Conjunctivae normal.   Cardiovascular:      Rate and Rhythm: Regular rhythm. Tachycardia present. Heart sounds: No murmur heard. Pulmonary:      Effort: Pulmonary effort is normal. No respiratory distress. Breath sounds: Normal breath sounds. Abdominal:      Palpations: Abdomen is soft. Tenderness: There is no abdominal tenderness.    Musculoskeletal:      Cervical back: Neck supple. Right lower leg: No edema. Left lower leg: No edema. Skin:     General: Skin is warm and dry. Capillary Refill: Capillary refill takes less than 2 seconds. Neurological:      Mental Status: She is alert. Psychiatric:         Mood and Affect: Mood normal.         ED Medications  Medications   ketorolac (TORADOL) injection 15 mg (15 mg Intravenous Given 9/6/23 2321)       Diagnostic Studies  Results Reviewed     Procedure Component Value Units Date/Time    Urine Microscopic [[id number]]  (Abnormal) Collected: 09/06/23 2311    Lab Status: Final result Specimen: Urine, Clean Catch Updated: 09/06/23 2349     RBC, UA 2-4 /hpf      WBC, UA 2-4 /hpf      Epithelial Cells Innumerable /hpf      Bacteria, UA None Seen /hpf      MUCUS THREADS Occasional    UA w Reflex to Microscopic w Reflex to Culture [[id number]]  (Abnormal) Collected: 09/06/23 2311    Lab Status: Final result Specimen: Urine, Clean Catch Updated: 09/06/23 2346     Color, UA Yellow     Clarity, UA Turbid     Specific Gravity, UA 1.021     pH, UA 5.5     Leukocytes, UA Negative     Nitrite, UA Negative     Protein, UA Trace mg/dl      Glucose, UA Negative mg/dl      Ketones, UA Negative mg/dl      Urobilinogen, UA <2.0 mg/dl      Bilirubin, UA Negative     Occult Blood, UA Moderate    POCT pregnancy, urine [[id number]]  (Normal) Resulted: 09/06/23 2316    Lab Status: Final result Updated: 09/06/23 2316     EXT Preg Test, Ur Negative     Control Valid                 CT renal stone study abdomen pelvis wo contrast   Final Result by Marylen Lathe, MD (09/07 0100)      No acute intra-abdominal abnormality. No free air or free fluid. No hydronephrosis or intrarenal calculus.             Workstation performed: [id number]               Procedures  Procedures      ED Course  ED Course as of 09/08/23 0443   Wed Sep 06, 2023   2324 PREGNANCY TEST URINE: Negative   2359 Leukocytes, UA: Negative   2359 Nitrite, UA: Negative   2359 RBC, UA(!): 2-4   2359 WBC, UA(!): 2-4   2359 Bacteria, UA: None Seen   2359 Blood, UA(!): Moderate                             SBIRT 20yo+    Flowsheet Row Most Recent Value   Initial Alcohol Screen: US AUDIT-C     1. How often do you have a drink containing alcohol? 0 Filed at: 09/06/2023 2225   2. How many drinks containing alcohol do you have on a typical day you are drinking? 0 Filed at: 09/06/2023 2225   3b. FEMALE Any Age, or MALE 65+: How often do you have 4 or more drinks on one occassion? 0 Filed at: 09/06/2023 2225   Audit-C Score 0 Filed at: 09/06/2023 2225   BREANNA: How many times in the past year have you. .. Used an illegal drug or used a prescription medication for non-medical reasons? Never Filed at: 09/06/2023 2225                Medical Decision Making  Patient is a 34-year-old female with no significant past medical history who presented with abdominal pain. Patient's differential includes nephrolithiasis, UTI, MSK injury, ovarian cyst, and pregnancy. Patient will be evaluated with a UA, urine pregnancy, and CT renal stone scan. We will give patient a dose of Toradol for pain. On reevaluation patient's pain has improved. Urine pregnancy is negative. UA shows moderate blood. CT is pending. Patient's care was signed out to incoming resident. Abdominal pain: acute illness or injury  Amount and/or Complexity of Data Reviewed  Labs: ordered. Decision-making details documented in ED Course. Radiology: ordered. Risk  Prescription drug management.             Disposition  Final diagnoses:   Abdominal pain     Time reflects when diagnosis was documented in both MDM as applicable and the Disposition within this note     Time User Action Codes Description Comment    9/6/2023 11:59 PM Dalton Pack Add [R10.9] Abdominal pain       ED Disposition     ED Disposition   Discharge    Condition   Stable    Date/Time   Thu Sep 7, 2023  1:21 AM    Comment   Yamilka Pathak discharge to home/self care.               Follow-up Information     Follow up With Specialties Details Why Contact Info Additional [address] Internal Medicine   [address] [facility] [id number] [address] [facility], [address] [facility], [address] [place], [place], [address]   [address] [facility] Emergency Department Emergency Medicine Go to  If symptoms worsen [address] [id number]  [facility] Emergency Department, [address] [facility], [address] [place], [place], [facility]          Discharge Medication List as of 9/7/2023  1:21 AM      CONTINUE these medications which have NOT CHANGED    Details   cyclobenzaprine (FLEXERIL) 10 mg tablet Take 1 tablet (10 mg total) by mouth 2 (two) times a day as needed for muscle spasms for up to 10 days, Starting Wed 3/8/2023, Until Sat 3/18/2023 at 2359, Normal      EPINEPHrine (EPIPEN) 0.3 mg/0.3 mL SOAJ Inject 0.3 mL (0.3 mg total) into a muscle once for 1 dose, Starting Sun 4/16/2023, Normal      meloxicam (Mobic) 15 mg tablet Take 1 tablet (15 mg total) by mouth daily, Starting Thu 1/26/2023, Normal      naproxen (Naprosyn) 500 mg tablet Take 1 tablet (500 mg total) by mouth 2 (two) times a day with meals for 10 days, Starting Wed 3/8/2023, Until Sat 3/18/2023, Normal           No discharge procedures on file. PDMP Review     None           ED Provider  Attending physically available and evaluated Zulma Teresa. I managed the patient along with the ED Attending.     Electronically Signed by         Kalyn Rogers MD  09/08/23 2029

## 2023-09-07 NOTE — ED NOTES
Patient ambulated to restroom at this time     [facility], [address] [place] [address]  09/06/23 2535